# Patient Record
Sex: FEMALE | Race: WHITE
[De-identification: names, ages, dates, MRNs, and addresses within clinical notes are randomized per-mention and may not be internally consistent; named-entity substitution may affect disease eponyms.]

---

## 2020-06-26 ENCOUNTER — HOSPITAL ENCOUNTER (INPATIENT)
Dept: HOSPITAL 36 - GERO | Age: 65
LOS: 22 days | Discharge: ANOTHER HOSPITAL | DRG: 885 | End: 2020-07-18
Attending: PSYCHIATRY & NEUROLOGY | Admitting: PSYCHIATRY & NEUROLOGY
Payer: MEDICARE

## 2020-06-26 DIAGNOSIS — E87.6: ICD-10-CM

## 2020-06-26 DIAGNOSIS — F41.9: ICD-10-CM

## 2020-06-26 DIAGNOSIS — E11.9: ICD-10-CM

## 2020-06-26 DIAGNOSIS — U07.1: ICD-10-CM

## 2020-06-26 DIAGNOSIS — F20.9: Primary | ICD-10-CM

## 2020-06-26 DIAGNOSIS — F12.10: ICD-10-CM

## 2020-06-26 DIAGNOSIS — F10.10: ICD-10-CM

## 2020-06-26 DIAGNOSIS — F30.9: ICD-10-CM

## 2020-06-26 DIAGNOSIS — I10: ICD-10-CM

## 2020-06-26 DIAGNOSIS — N39.0: ICD-10-CM

## 2020-06-26 PROCEDURE — X3904: HCPCS

## 2020-06-26 PROCEDURE — Z7610: HCPCS

## 2020-06-27 VITALS — DIASTOLIC BLOOD PRESSURE: 52 MMHG | SYSTOLIC BLOOD PRESSURE: 100 MMHG

## 2020-06-27 LAB
CHOLEST SERPL-MCNC: 152 MG/DL (ref ?–200)
LDLC SERPL DIRECT ASSAY-MCNC: 90 MG/DL (ref 75–193)
TRIGL SERPL-MCNC: 100 MG/DL (ref ?–150)

## 2020-06-27 RX ADMIN — INSULIN LISPRO SCH: 100 INJECTION, SOLUTION INTRAVENOUS; SUBCUTANEOUS at 16:30

## 2020-06-27 RX ADMIN — INSULIN LISPRO SCH: 100 INJECTION, SOLUTION INTRAVENOUS; SUBCUTANEOUS at 07:30

## 2020-06-27 RX ADMIN — INSULIN LISPRO SCH: 100 INJECTION, SOLUTION INTRAVENOUS; SUBCUTANEOUS at 20:50

## 2020-06-27 RX ADMIN — INSULIN LISPRO SCH: 100 INJECTION, SOLUTION INTRAVENOUS; SUBCUTANEOUS at 11:43

## 2020-06-27 NOTE — HISTORY AND PHYSICAL
History of Present Illness





- HPI


Chief Complaint: 


Psychosis





HPI: 





* Transferred from [facility]


* Patient was transferred on a 5150 hold


Vital Signs: 





 Last Vital Signs











Temp  97.9 F   06/27/20 06:26


 


Pulse  96   06/27/20 06:26


 


Resp  20   06/27/20 06:26


 


BP  121/83   06/27/20 06:26


 


Pulse Ox  96   06/27/20 06:26














Past Medical History


Cardiovascular: Report: No Pertinent Hx


Pulmonary: Report: No Pertinent Hx


CNS: Report: No Pertinent Hx


GI: Report: No Pertinent Hx


Psych: Report: Anxiety, Depression


Infectious Disease: Report: Other (UTI)


Endocrine: Report: Diabetes





Family Medical History





- Family Member


  ** Mother


History Unknown: Yes


Ethnicity: Unknown


Living Status: Unknown


Hx Family Cancer:  (unknown)


Hx Family Coronary Artery Disease:  (unknown)


Hx Family Congestive Heart Failure:  (unknown)


Hx Family Hypertension:  (unknown)


Hx Family Stroke:  (unknown)


Hx Family Diabetes:  (unknown)


Hx Family Seizures:  (unknown)


Hx Family Dementia:  (unknown)


Hx Family AIDS:  (unknown)


Hx Family COPD:  (unknown)


Hx Family Hepatitis:  (unknown)


Hx Family Psychiatric Problems:  (unknown)


Hx Family Tuberculosis:  (unknown)





Social History


Smoke: No


Alcohol: Occassional


Drugs: Marijuana


Lives: With Family





- Allergies


Allergies/Adverse Reactions: 


 Allergies











Allergy/AdvReac Type Severity Reaction Status Date / Time


 


No Known Allergies Allergy   Verified 06/27/20 00:08














Review of Systems





- Review of Systems


Constitutional: Report: No Significant


Eyes: Report: No Significant


ENT: Report: No Significant


Respiratory: Report: No Significant


Cardiovascular: Report: No Significant


Gastrointestinal: Report: No Significant


Genitourinary: Report: No Significant


Musculoskeletal: Report: No Significant


Skin: Report: No Significant


Neurological: Report: No Significant





Physical Exam





- Physical Exam


HEENT: Report: Ears Nose Throat within normal limits, Pharnyx within normal 

limits


Neck: Report: Within normal limits


Cardiovascular Systems: Report: Regular, Rate and Rhythm, no murmurs noted


Respiratory: Report: Clear to Auscultation of lung fields, Breath Sounds are 

within normal limits


Abdomen: Report: Non-tender to palpation, Bowel Sounds are within normal limits


Back: Report: Inspection of back is within normal limits.


Extremities: Report: Non-tender to palpation., Patient had full range of motion

, No pedal edema was noted on inspection


Skin: Report: Color of skin is within normal limits, Warm, Dry, No Rashes noted 

of the skin


Neuro/Psych: Report: CN II-XII intact, No sensory deficit





- Lab Results


All Lab Results last 24 hours: 





 Laboratory Results - last 24 hr











  06/26/20 06/27/20 06/27/20





  23:09 06:18 12:16


 


POC Glucose  112 H  85 


 


Triglycerides    100


 


Cholesterol    152


 


LDL Cholesterol Direct    90


 


HDL Cholesterol    41














- Assessment


Assessment: 





* Manic Disorder


* UTI


* DM


* Anxiety


* Depression


* 5150 Hold


* Alcohol Abuse


* Marijuana Abuse





- Plan


Plan: 





* Admitted to GeroPsych Unit to [facility]


* Psychiatry Consult


* Continue home meds


* Obtain labs





Cranial Nerve Assessment





- CRANIAL NERVES


alcohol swab:: Yes


Distinguishes movements in peripheral field.:: Yes


up, down, sideways:: Yes


on forehead, cheeks and chin, chews symmetrically:: Yes


FACIAL VII: upper: Frowns Symmetrically:: Yes


FACIAL VII: Lower: Smiles Symmetrically:: Yes


both ears:: Yes


GLOSS-PHARYNGEAL IX: Has gag reflex:: Yes


VAGUS X: Can make guttural sounds:: Yes


ACCESSORY XI: Shrugs shoulders symmetrically:: Yes


tremors or fasciculation's:: Yes





- MOTOR


spasticity, cogwheel, atrophy, tremor, asterixis, other: Yes





- COORDINATION


Finger to nose, heel to shin, BILL, gait, Romberg: Yes





- SENSORY


signs, Brudzinski, Kernig, neck rigidity:: Yes





- REFLEXES


Brachioradials Right:: Yes


Brachioradials Left:: Yes


Biceps Right:: Yes


Biceps Left:: Yes


Triceps Right:: Yes


Triceps Left:: Yes


Knee Right:: Yes


Knee Left:: Yes


Ankle Right:: Yes


Ankle Left:: Yes


Babinski Right:: No


Babinski Left:: No

## 2020-06-28 RX ADMIN — INSULIN LISPRO SCH: 100 INJECTION, SOLUTION INTRAVENOUS; SUBCUTANEOUS at 16:37

## 2020-06-28 RX ADMIN — INSULIN LISPRO SCH: 100 INJECTION, SOLUTION INTRAVENOUS; SUBCUTANEOUS at 11:30

## 2020-06-28 RX ADMIN — INSULIN LISPRO SCH: 100 INJECTION, SOLUTION INTRAVENOUS; SUBCUTANEOUS at 21:01

## 2020-06-28 RX ADMIN — INSULIN LISPRO SCH: 100 INJECTION, SOLUTION INTRAVENOUS; SUBCUTANEOUS at 06:38

## 2020-06-28 NOTE — PROGRESS NOTES
DATE:  06/28/2020



SUBJECTIVE:  The patient was seen and evaluated.  The patient's chart reviewed.



Today on face-to-face evaluation, the patient reports that she is watching all

the ____ with COVID.  She reports that people are trying to poison her,

specifically, the nurses with COVID.



EXAMINATION:  Delusional, believing that people are trying to poison her with

COVID.



ASSESSMENT AND PLAN:  Aggressive ____ believing that people are trying to poison

her.  We will continue increasing Seroquel to 50.





DD: 06/28/2020 07:25

DT: 06/28/2020 10:32

[facility]# [id number]  [id number]
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
## 2020-06-29 LAB
ALT SERPL-CCNC: 25 U/L (ref 12–78)
ANION GAP SERPL CALC-SCNC: 20 MMOL/L (ref 5–15)
AST SERPL-CCNC: 33 U/L (ref 10–37)
BASOPHILS # BLD AUTO: 0.1 K/UL (ref 0–0.2)
BASOPHILS NFR BLD AUTO: 0.5 % (ref 0–2)
BILIRUB SERPL-MCNC: 0.9 MG/DL (ref 0–1)
BUN SERPL-MCNC: 20 MG/DL (ref 8–21)
CALCIUM SERPL-MCNC: 9.2 MG/DL (ref 8.4–10.2)
CHLORIDE SERPL-SCNC: 101 MMOL/L (ref 98–107)
CO2 SERPL-SCNC: 21 MMOL/L (ref 23–29)
CREAT SERPL-MCNC: 1.03 MG/DL (ref 0.7–1.3)
EOSINOPHIL # BLD AUTO: 0.1 K/UL (ref 0–0.4)
EOSINOPHIL NFR BLD AUTO: 1.2 % (ref 0–4)
ERYTHROCYTE [DISTWIDTH] IN BLOOD BY AUTOMATED COUNT: 13.3 % (ref 9–15)
HCT VFR BLD CALC: 46.3 % (ref 36–48)
HGB BLD-MCNC: 15.8 G/DL (ref 12–16)
LYMPHOCYTE AB SER FC-ACNC: 1.7 K/UL (ref 1–5.5)
LYMPHOCYTES NFR BLD AUTO: 15.2 % (ref 20.5–51.5)
MCH RBC QN AUTO: 32 PG (ref 27–31)
MCHC RBC AUTO-ENTMCNC: 34 % (ref 32–36)
MCV RBC AUTO: 94 FL (ref 79–98)
MONOCYTES # BLD AUTO: 0.8 K/UL (ref 0–1)
MONOCYTES NFR BLD AUTO: 7.3 % (ref 1.7–9.3)
NEUTROPHILS # BLD: 8.5 K/UL (ref 1.8–7.7)
NEUTROPHILS NFR BLD AUTO: 75.8 % (ref 40–70)
PLATELET # BLD: 228 K/UL (ref 130–430)
POTASSIUM SERPL-SCNC: 3.1 MMOL/L (ref 3.5–5.1)
RBC # BLD AUTO: 4.95 MIL/UL (ref 4.2–6.2)
WBC # BLD AUTO: 11.3 K/UL (ref 4.8–10.8)

## 2020-06-29 RX ADMIN — INSULIN LISPRO SCH: 100 INJECTION, SOLUTION INTRAVENOUS; SUBCUTANEOUS at 06:38

## 2020-06-29 RX ADMIN — INSULIN LISPRO SCH: 100 INJECTION, SOLUTION INTRAVENOUS; SUBCUTANEOUS at 11:10

## 2020-06-29 RX ADMIN — INSULIN LISPRO SCH: 100 INJECTION, SOLUTION INTRAVENOUS; SUBCUTANEOUS at 20:59

## 2020-06-29 RX ADMIN — INSULIN LISPRO SCH: 100 INJECTION, SOLUTION INTRAVENOUS; SUBCUTANEOUS at 16:09
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
## 2020-06-29 NOTE — INTERNAL MEDICINE PROG NOTE
Internal Medicine Subjective





- Subjective


Service Date: 06/29/20


Patient seen and examined:: without staff


Patient is:: awake, interactive, in bed





Internal Medicine Objective





- Results


Result Diagrams: 


 06/29/20 11:17





 06/29/20 11:17


Recent Labs: 


 Laboratory Last Values











WBC  11.3 K/uL (4.8-10.8)  H  06/29/20  11:17    


 


RBC  4.95 MIL/uL (4.2-6.2)   06/29/20  11:17    


 


Hgb  15.8 g/dL (12.0-16.0)   06/29/20  11:17    


 


Hct  46.3 % (36-48)   06/29/20  11:17    


 


MCV  94 fL (79.0-98.0)   06/29/20  11:17    


 


MCH  32 pg (27-31)  H  06/29/20  11:17    


 


MCHC  34 % (32-36)   06/29/20  11:17    


 


RDW  13.3 % (9.0-15.0)   06/29/20  11:17    


 


Plt Count  228 K/uL (130-430)   06/29/20  11:17    


 


MPV  10.7 fl (7.4-10.4)  H  06/29/20  11:17    


 


Neut % (Auto)  75.8 % (40-70)  H  06/29/20  11:17    


 


Lymph % (Auto)  15.2 % (20.5-51.5)  L  06/29/20  11:17    


 


Mono % (Auto)  7.3 % (1.7-9.3)   06/29/20  11:17    


 


Eos % (Auto)  1.2 % (0-4)   06/29/20  11:17    


 


Baso % (Auto)  0.5 % (0.0-2.0)   06/29/20  11:17    


 


Neut # (Auto)  8.5 K/uL (1.8-7.7)  H  06/29/20  11:17    


 


Lymph # (Auto)  1.7 K/uL (1.0-5.5)   06/29/20  11:17    


 


Mono # (Auto)  0.8 K/uL (0.0-1.0)   06/29/20  11:17    


 


Eos # (Auto)  0.1 K/uL (0.0-0.4)   06/29/20  11:17    


 


Baso # (Auto)  0.1 K/uL (0.0-0.2)   06/29/20  11:17    


 


Sodium  139 mmol/L (136-145)   06/29/20  11:17    


 


Potassium  3.1 mmol/L (3.5-5.1)  L  06/29/20  11:17    


 


Chloride  101 mmol/L ()   06/29/20  11:17    


 


Carbon Dioxide  21 mmol/L (23-29)  L  06/29/20  11:17    


 


Anion Gap  20  (5-15)  H  06/29/20  11:17    


 


BUN  20 mg/dL (8-21)   06/29/20  11:17    


 


Creatinine  1.03 mg/dL (0.70-1.30)   06/29/20  11:17    


 


Glucose  75 mg/dL (70-99)   06/29/20  11:17    


 


POC Glucose  100 MG/DL (70 - 105)   06/29/20  11:03    


 


Calcium  9.2 mg/dL (8.4-10.2)   06/29/20  11:17    


 


Total Bilirubin  0.9 mg/dL (0.0-1.0)   06/29/20  11:17    


 


AST  33 U/L (10-37)   06/29/20  11:17    


 


ALT  25 U/L (12-78)   06/29/20  11:17    


 


Alkaline Phosphatase  89 U/L ()   06/29/20  11:17    


 


Total Protein  7.6 g/dL (6.4-8.3)   06/29/20  11:17    


 


Albumin  3.8 g/dL (3.4-5.0)   06/29/20  11:17    


 


Triglycerides  100 mg/dL (<150)   06/27/20  12:16    


 


Cholesterol  152 mg/dL (<200)   06/27/20  12:16    


 


LDL Cholesterol Direct  90 mg/dL ()   06/27/20  12:16    


 


HDL Cholesterol  41 mg/dL (23-92)   06/27/20  12:16    














- Physical Exam


Vitals and I&O: 


 Vital Signs











Temp  0 F   06/29/20 06:49


 


Pulse  90   06/28/20 06:11


 


Resp  17   06/28/20 20:00


 


BP  132/84   06/28/20 06:11


 


Pulse Ox  99   06/28/20 06:11








 Intake & Output











 06/28/20 06/29/20 06/29/20





 18:59 06:59 18:59


 


Intake Total 800 240 


 


Balance 800 240 


 


Intake:   


 


  Oral 800 240 


 


Other:   


 


  # Voids 3 3 


 


  # Bowel Movements 0 0 


 


  Stool Characteristics Soft Soft Soft





  Formed 





  Brown 











Active Medications: 


Current Medications





Acetaminophen (Tylenol)  650 mg PO Q4H PRN


   PRN Reason: Pain (Mild 1-3)


   Stop: 08/26/20 00:26


Al Hydrox/Mg Hydrox/Simethicone (Maalox)  30 ml PO Q4HR PRN


   PRN Reason: GI DISTRESS


   Stop: 08/26/20 00:26


Dextrose (Glutose 40%)  18.75 gm PO PRN PRN


   PRN Reason: Blood Glucose less than 70


   Stop: 08/26/20 01:51


Glucagon (Glucagen)  1 mg IM PRN PRN


   PRN Reason: Blood Glucose less than 70


   Stop: 08/26/20 01:51


Insulin Human Lispro (Humalog Insulin Sliding Scale)  [facility]; 

Protocol


   Stop: 08/26/20 07:29


   Last Admin: 06/29/20 11:10 Dose:  Not Given


Lorazepam (Ativan)  0.5 mg PO Q4HR PRN; Protocol


   PRN Reason: Agitation


   Stop: 07/27/20 00:26


Magnesium Hydroxide (Milk Of Magnesia)  30 ml PO HS PRN


   PRN Reason: Constipation


Multivitamins/Vitamin C (Theragran)  1 tab PO DAILY LARRY


   Stop: 08/26/20 08:59


   Last Admin: 06/29/20 08:11 Dose:  Not Given


Quetiapine Fumarate (Seroquel)  50 mg PO HS LARRY; Protocol


   Stop: 08/27/20 20:59


   Last Admin: 06/28/20 21:01 Dose:  Not Given


Zolpidem Tartrate (Ambien)  5 mg PO HS PRN


   PRN Reason: Insomnia


   Stop: 08/26/20 00:26








General: weak


HEENT: NC/AT, PERRLA


Neck: Supple


Lungs: CTAB


Cardiovascular: RRR, Normal S1, Normal S2


Abdomen: soft


Extremities: clear





Internal Medicine Assmt/Plan





- Assessment


Assessment: 





1.  DM/HTN


2.  Callie





- Plan


Plan: 





check hga1c


check lipid panel


continue sliding scale insulin


requested home meds


d/w r.n.


reviewed complete medical records

## 2020-06-30 LAB
APPEARANCE UR: (no result)
BILIRUB UR QL STRIP: (no result)
COLOR UR: YELLOW
GLUCOSE UR STRIP-MCNC: NEGATIVE MG/DL
HGB UR QL STRIP: (no result)
KETONES UR STRIP-MCNC: (no result) MG/DL
LEUKOCYTE ESTERASE UR QL STRIP: NEGATIVE
NITRITE UR QL STRIP: NEGATIVE
PH UR STRIP: 6 [PH] (ref 5–8)
PROT UR QL STRIP: (no result)
SP GR UR STRIP: >=1.03 (ref 1–1.03)
UROBILINOGEN UR STRIP-MCNC: 0.2 MG/DL (ref 0.2–1)

## 2020-06-30 RX ADMIN — INSULIN LISPRO SCH: 100 INJECTION, SOLUTION INTRAVENOUS; SUBCUTANEOUS at 06:34

## 2020-06-30 RX ADMIN — INSULIN LISPRO SCH: 100 INJECTION, SOLUTION INTRAVENOUS; SUBCUTANEOUS at 20:31

## 2020-06-30 RX ADMIN — INSULIN LISPRO SCH: 100 INJECTION, SOLUTION INTRAVENOUS; SUBCUTANEOUS at 15:52

## 2020-06-30 RX ADMIN — INSULIN LISPRO SCH: 100 INJECTION, SOLUTION INTRAVENOUS; SUBCUTANEOUS at 12:23

## 2020-06-30 NOTE — INTERNAL MEDICINE PROG NOTE
Internal Medicine Subjective





- Subjective


Service Date: 06/30/20


Patient seen and examined:: without staff


Patient is:: awake, interactive, in bed


Per staff patient has:: no adverse event, no episodes of fall (no fevers, no 

cough, no sob)





Internal Medicine Objective





- Results


Result Diagrams: 


 06/29/20 11:17





 06/29/20 11:17


Recent Labs: 


 Laboratory Last Values











WBC  11.3 K/uL (4.8-10.8)  H  06/29/20  11:17    


 


RBC  4.95 MIL/uL (4.2-6.2)   06/29/20  11:17    


 


Hgb  15.8 g/dL (12.0-16.0)   06/29/20  11:17    


 


Hct  46.3 % (36-48)   06/29/20  11:17    


 


MCV  94 fL (79.0-98.0)   06/29/20  11:17    


 


MCH  32 pg (27-31)  H  06/29/20  11:17    


 


MCHC  34 % (32-36)   06/29/20  11:17    


 


RDW  13.3 % (9.0-15.0)   06/29/20  11:17    


 


Plt Count  228 K/uL (130-430)   06/29/20  11:17    


 


MPV  10.7 fl (7.4-10.4)  H  06/29/20  11:17    


 


Neut % (Auto)  75.8 % (40-70)  H  06/29/20  11:17    


 


Lymph % (Auto)  15.2 % (20.5-51.5)  L  06/29/20  11:17    


 


Mono % (Auto)  7.3 % (1.7-9.3)   06/29/20  11:17    


 


Eos % (Auto)  1.2 % (0-4)   06/29/20  11:17    


 


Baso % (Auto)  0.5 % (0.0-2.0)   06/29/20  11:17    


 


Neut # (Auto)  8.5 K/uL (1.8-7.7)  H  06/29/20  11:17    


 


Lymph # (Auto)  1.7 K/uL (1.0-5.5)   06/29/20  11:17    


 


Mono # (Auto)  0.8 K/uL (0.0-1.0)   06/29/20  11:17    


 


Eos # (Auto)  0.1 K/uL (0.0-0.4)   06/29/20  11:17    


 


Baso # (Auto)  0.1 K/uL (0.0-0.2)   06/29/20  11:17    


 


Sodium  139 mmol/L (136-145)   06/29/20  11:17    


 


Potassium  3.1 mmol/L (3.5-5.1)  L  06/29/20  11:17    


 


Chloride  101 mmol/L ()   06/29/20  11:17    


 


Carbon Dioxide  21 mmol/L (23-29)  L  06/29/20  11:17    


 


Anion Gap  20  (5-15)  H  06/29/20  11:17    


 


BUN  20 mg/dL (8-21)   06/29/20  11:17    


 


Creatinine  1.03 mg/dL (0.70-1.30)   06/29/20  11:17    


 


Glucose  75 mg/dL (70-99)   06/29/20  11:17    


 


POC Glucose  100 MG/DL (70 - 105)   06/29/20  11:03    


 


Calcium  9.2 mg/dL (8.4-10.2)   06/29/20  11:17    


 


Total Bilirubin  0.9 mg/dL (0.0-1.0)   06/29/20  11:17    


 


AST  33 U/L (10-37)   06/29/20  11:17    


 


ALT  25 U/L (12-78)   06/29/20  11:17    


 


Alkaline Phosphatase  89 U/L ()   06/29/20  11:17    


 


Total Protein  7.6 g/dL (6.4-8.3)   06/29/20  11:17    


 


Albumin  3.8 g/dL (3.4-5.0)   06/29/20  11:17    


 


Triglycerides  100 mg/dL (<150)   06/27/20  12:16    


 


Cholesterol  152 mg/dL (<200)   06/27/20  12:16    


 


LDL Cholesterol Direct  90 mg/dL ()   06/27/20  12:16    


 


HDL Cholesterol  41 mg/dL (23-92)   06/27/20  12:16    


 


Urine Color  YELLOW  (YELLOW)   06/29/20  11:00    


 


Urine Clarity  HAZY  (CLEAR)  H  06/29/20  11:00    


 


Urine pH  6.0  (5.0-8.0)   06/29/20  11:00    


 


Ur Specific Gravity  >=1.030  (1.005-1.030)  H  06/29/20  11:00    


 


Urine Protein  1+  (NEGATIVE)  H  06/29/20  11:00    


 


Urine Ketones  3+  (NEGATIVE)  H  06/29/20  11:00    


 


Urine Blood  TRACE  (NEGATIVE)  H  06/29/20  11:00    


 


Urine Nitrate  NEGATIVE  (NEGATIVE)   06/29/20  11:00    


 


Urine Bilirubin  1+  (NEGATIVE)  H  06/29/20  11:00    


 


Urine Urobilinogen  0.2  (0.2-1.0)   06/29/20  11:00    


 


Ur Leukocyte Esterase  NEGATIVE  (NEGATIVE)   06/29/20  11:00    


 


Urine Glucose  NEGATIVE  (NEGATIVE)   06/29/20  11:00    














- Physical Exam


Vitals and I&O: 


 Vital Signs











Temp  98.3 F   06/30/20 06:23


 


Pulse  76   06/30/20 06:23


 


Resp  18   06/30/20 08:00


 


BP  129/89   06/30/20 06:23


 


Pulse Ox  97   06/30/20 06:23








 Intake & Output











 06/29/20 06/30/20 06/30/20





 18:59 06:59 18:59


 


Intake Total 450 240 


 


Balance 450 240 


 


Intake:   


 


  Oral 450 240 


 


Other:   


 


  # Voids 3 3 


 


  # Bowel Movements 0 0 


 


  Stool Characteristics Soft Soft 





  Formed 





  Brown 











Active Medications: 


Current Medications





Acetaminophen (Tylenol)  650 mg PO Q4H PRN


   PRN Reason: Pain (Mild 1-3)


   Stop: 08/26/20 00:26


Al Hydrox/Mg Hydrox/Simethicone (Maalox)  30 ml PO Q4HR PRN


   PRN Reason: GI DISTRESS


   Stop: 08/26/20 00:26


Dextrose (Glutose 40%)  18.75 gm PO PRN PRN


   PRN Reason: BS Below 70 if tolerate po


   Stop: 08/26/20 01:51


Glucagon (Glucagen)  1 mg IM PRN PRN


   PRN Reason: BS Below 70 if not tolerate po


   Stop: 08/26/20 01:51


Insulin Human Lispro (Humalog Insulin Sliding Scale)  0 units SUBQ [facility] LARRY; 

Protocol


   Stop: 08/26/20 07:29


   Last Admin: 06/30/20 12:23 Dose:  Not Given


Lorazepam (Ativan)  0.5 mg PO Q4HR PRN; Protocol


   PRN Reason: Agitation


   Stop: 07/27/20 00:26


Magnesium Hydroxide (Milk Of Magnesia)  30 ml PO HS PRN


   PRN Reason: Constipation


Multivitamins/Vitamin C (Theragran)  1 tab PO DAILY LARRY


   Stop: 08/26/20 08:59


   Last Admin: 06/30/20 08:01 Dose:  Not Given


Quetiapine Fumarate (Seroquel)  50 mg PO HS LARRY; Protocol


   Stop: 08/27/20 20:59


   Last Admin: 06/29/20 20:59 Dose:  Not Given


Zolpidem Tartrate (Ambien)  5 mg PO HS PRN


   PRN Reason: Insomnia


   Stop: 08/26/20 00:26








General: weak


HEENT: NC/AT, PERRLA


Neck: Supple


Lungs: CTAB


Cardiovascular: RRR, Normal S1, Normal S2


Abdomen: soft


Extremities: clear





Internal Medicine Assmt/Plan





- Assessment


Assessment: 





1.  DM/HTN


2.  Rule out Covid-19





- Plan


Plan: 





check hga1c


check lipid panel


continue sliding scale insulin


requested home meds


Covid-19 PCR test


d/w r.n.


reviewed complete medical records

## 2020-07-01 LAB — HBA1C BLD-MCNC: 5 G/DL

## 2020-07-01 RX ADMIN — INSULIN LISPRO SCH: 100 INJECTION, SOLUTION INTRAVENOUS; SUBCUTANEOUS at 21:28

## 2020-07-01 RX ADMIN — INSULIN LISPRO SCH: 100 INJECTION, SOLUTION INTRAVENOUS; SUBCUTANEOUS at 06:32

## 2020-07-01 RX ADMIN — INSULIN LISPRO SCH: 100 INJECTION, SOLUTION INTRAVENOUS; SUBCUTANEOUS at 17:19

## 2020-07-01 RX ADMIN — INSULIN LISPRO SCH: 100 INJECTION, SOLUTION INTRAVENOUS; SUBCUTANEOUS at 12:40

## 2020-07-01 NOTE — INTERNAL MEDICINE PROG NOTE
Internal Medicine Subjective





- Subjective


Service Date: 07/01/20


Patient is:: awake, interactive, in bed


Per staff patient has:: no adverse event, no episodes of fall (no fevers, no 

cough, no sob)





Internal Medicine Objective





- Results


Result Diagrams: 


 06/29/20 11:17





 06/29/20 11:17


Recent Labs: 


 Laboratory Last Values











WBC  11.3 K/uL (4.8-10.8)  H  06/29/20  11:17    


 


RBC  4.95 MIL/uL (4.2-6.2)   06/29/20  11:17    


 


Hgb  15.8 g/dL (12.0-16.0)   06/29/20  11:17    


 


Hct  46.3 % (36-48)   06/29/20  11:17    


 


MCV  94 fL (79.0-98.0)   06/29/20  11:17    


 


MCH  32 pg (27-31)  H  06/29/20  11:17    


 


MCHC  34 % (32-36)   06/29/20  11:17    


 


RDW  13.3 % (9.0-15.0)   06/29/20  11:17    


 


Plt Count  228 K/uL (130-430)   06/29/20  11:17    


 


MPV  10.7 fl (7.4-10.4)  H  06/29/20  11:17    


 


Neut % (Auto)  75.8 % (40-70)  H  06/29/20  11:17    


 


Lymph % (Auto)  15.2 % (20.5-51.5)  L  06/29/20  11:17    


 


Mono % (Auto)  7.3 % (1.7-9.3)   06/29/20  11:17    


 


Eos % (Auto)  1.2 % (0-4)   06/29/20  11:17    


 


Baso % (Auto)  0.5 % (0.0-2.0)   06/29/20  11:17    


 


Neut # (Auto)  8.5 K/uL (1.8-7.7)  H  06/29/20  11:17    


 


Lymph # (Auto)  1.7 K/uL (1.0-5.5)   06/29/20  11:17    


 


Mono # (Auto)  0.8 K/uL (0.0-1.0)   06/29/20  11:17    


 


Eos # (Auto)  0.1 K/uL (0.0-0.4)   06/29/20  11:17    


 


Baso # (Auto)  0.1 K/uL (0.0-0.2)   06/29/20  11:17    


 


Sodium  139 mmol/L (136-145)   06/29/20  11:17    


 


Potassium  3.1 mmol/L (3.5-5.1)  L  06/29/20  11:17    


 


Chloride  101 mmol/L ()   06/29/20  11:17    


 


Carbon Dioxide  21 mmol/L (23-29)  L  06/29/20  11:17    


 


Anion Gap  20  (5-15)  H  06/29/20  11:17    


 


BUN  20 mg/dL (8-21)   06/29/20  11:17    


 


Creatinine  1.03 mg/dL (0.70-1.30)   06/29/20  11:17    


 


Glucose  75 mg/dL (70-99)   06/29/20  11:17    


 


POC Glucose  100 MG/DL (70 - 105)   06/29/20  11:03    


 


Calcium  9.2 mg/dL (8.4-10.2)   06/29/20  11:17    


 


Total Bilirubin  0.9 mg/dL (0.0-1.0)   06/29/20  11:17    


 


AST  33 U/L (10-37)   06/29/20  11:17    


 


ALT  25 U/L (12-78)   06/29/20  11:17    


 


Alkaline Phosphatase  89 U/L ()   06/29/20  11:17    


 


Total Protein  7.6 g/dL (6.4-8.3)   06/29/20  11:17    


 


Albumin  3.8 g/dL (3.4-5.0)   06/29/20  11:17    


 


Triglycerides  100 mg/dL (<150)   06/27/20  12:16    


 


Cholesterol  152 mg/dL (<200)   06/27/20  12:16    


 


LDL Cholesterol Direct  90 mg/dL ()   06/27/20  12:16    


 


HDL Cholesterol  41 mg/dL (23-92)   06/27/20  12:16    


 


Urine Color  YELLOW  (YELLOW)   06/29/20  11:00    


 


Urine Clarity  HAZY  (CLEAR)  H  06/29/20  11:00    


 


Urine pH  6.0  (5.0-8.0)   06/29/20  11:00    


 


Ur Specific Gravity  >=1.030  (1.005-1.030)  H  06/29/20  11:00    


 


Urine Protein  1+  (NEGATIVE)  H  06/29/20  11:00    


 


Urine Ketones  3+  (NEGATIVE)  H  06/29/20  11:00    


 


Urine Blood  TRACE  (NEGATIVE)  H  06/29/20  11:00    


 


Urine Nitrate  NEGATIVE  (NEGATIVE)   06/29/20  11:00    


 


Urine Bilirubin  1+  (NEGATIVE)  H  06/29/20  11:00    


 


Urine Urobilinogen  0.2  (0.2-1.0)   06/29/20  11:00    


 


Ur Leukocyte Esterase  NEGATIVE  (NEGATIVE)   06/29/20  11:00    


 


Urine Glucose  NEGATIVE  (NEGATIVE)   06/29/20  11:00    


 


Coronavirus (PCR)  Negative  (Negative)   06/28/20  15:45    














- Physical Exam


Vitals and I&O: 


 Vital Signs











Temp  97 F   07/01/20 14:00


 


Pulse  80   07/01/20 14:00


 


Resp  18   07/01/20 14:00


 


BP  136/79   07/01/20 14:00


 


Pulse Ox  98   07/01/20 14:00








 Intake & Output











 06/30/20 07/01/20 07/01/20





 18:59 06:59 18:59


 


Intake Total 360 240 120


 


Balance 360 240 120


 


Intake:   


 


  Oral 360 240 120


 


Other:   


 


  # Voids 3 3 2


 


  # Bowel Movements 0 0 0











Active Medications: 


Current Medications





Acetaminophen (Tylenol)  650 mg PO Q4H PRN


   PRN Reason: Pain (Mild 1-3)


   Stop: 08/26/20 00:26


Al Hydrox/Mg Hydrox/Simethicone (Maalox)  30 ml PO Q4HR PRN


   PRN Reason: GI DISTRESS


   Stop: 08/26/20 00:26


Dextrose (Glutose 40%)  18.75 gm PO PRN PRN


   PRN Reason: BS Below 70 if tolerate po


   Stop: 08/26/20 01:51


Glucagon (Glucagen)  1 mg IM PRN PRN


   PRN Reason: BS Below 70 if not tolerate po


   Stop: 08/26/20 01:51


Insulin Human Lispro (Humalog Insulin Sliding Scale)  0 units SUBQ ACHS LARRY; 

Protocol


   Stop: 08/26/20 07:29


   Last Admin: 07/01/20 17:19 Dose:  Not Given


Lorazepam (Ativan)  0.5 mg PO Q4HR PRN; Protocol


   PRN Reason: Agitation


   Stop: 07/27/20 00:26


Magnesium Hydroxide (Milk Of Magnesia)  30 ml PO HS PRN


   PRN Reason: Constipation


Multivitamins/Vitamin C (Theragran)  1 tab PO DAILY LARRY


   Stop: 08/26/20 08:59


   Last Admin: 07/01/20 08:21 Dose:  Not Given


Quetiapine Fumarate (Seroquel)  50 mg PO HS LARRY; Protocol


   Stop: 08/27/20 20:59


   Last Admin: 06/30/20 20:31 Dose:  Not Given


Zolpidem Tartrate (Ambien)  5 mg PO HS PRN


   PRN Reason: Insomnia


   Stop: 08/26/20 00:26








General: weak


HEENT: NC/AT, PERRLA


Neck: Supple


Lungs: CTAB


Cardiovascular: RRR, Normal S1, Normal S2


Abdomen: soft


Extremities: clear





Internal Medicine Assmt/Plan





- Assessment


Assessment: 





1.  DM


2.  HTN








- Plan


Plan: 








check lipid panel


continue sliding scale insulin


requested home meds


await Covid-19 PCR test


d/w r.n.


reviewed complete medical records

## 2020-07-01 NOTE — PROGRESS NOTES
DATE:     07/01/2020



Case was discussed with staff of the patient, reviewed records.  The patient

seems to be delusional and paranoid.  When I tried talk to her, she apparently

had her breakfast tray and she eat none of it.  She said I do not need that. 

She said I can see what's in it.  She continues to talk about her family being 
peophiles

and that she will not talk to them that she is scared of them.  Does not want

anything to have to do them including her .  The patient is unable to

make safe plan for self-care.  The patient was found sleeping in someone's

garage, was acting aggressive with a history of schizophrenia.  The patient

believes that she reported this to the [employer] and they are underway.  The patient

is refusing medication again.  When I asked if she take she said she will take

nothing.  She does not trust anybody.  She believes she does not need any

medication, so this has been brought up with her almost on a daily basis and she

does not believe she needs to be on medication, she would not tell me what

medication she will be willing to take, i listed side effects and alternatives 
so I will be initiating  a reise.  We will

continue outpatient group therapy, milieu therapy, and adjust medications as

needed.





DD: 07/01/2020 08:13

DT: 07/01/2020 11:40

JOB# [id number]  [id number]

ISIDRA

## 2020-07-02 RX ADMIN — INSULIN LISPRO SCH: 100 INJECTION, SOLUTION INTRAVENOUS; SUBCUTANEOUS at 11:25

## 2020-07-02 RX ADMIN — INSULIN LISPRO SCH: 100 INJECTION, SOLUTION INTRAVENOUS; SUBCUTANEOUS at 16:35

## 2020-07-02 RX ADMIN — INSULIN LISPRO SCH: 100 INJECTION, SOLUTION INTRAVENOUS; SUBCUTANEOUS at 21:10

## 2020-07-02 RX ADMIN — INSULIN LISPRO SCH: 100 INJECTION, SOLUTION INTRAVENOUS; SUBCUTANEOUS at 06:50

## 2020-07-02 NOTE — INTERNAL MEDICINE PROG NOTE
Internal Medicine Subjective





- Subjective


Service Date: 07/02/20


Patient is:: awake, interactive, in bed


Per staff patient has:: no adverse event, no episodes of fall (no fevers, no 

cough, no sob)





Internal Medicine Objective





- Results


Result Diagrams: 


 06/29/20 11:17





 06/29/20 11:17


Recent Labs: 


 Laboratory Last Values











WBC  11.3 K/uL (4.8-10.8)  H  06/29/20  11:17    


 


RBC  4.95 MIL/uL (4.2-6.2)   06/29/20  11:17    


 


Hgb  15.8 g/dL (12.0-16.0)   06/29/20  11:17    


 


Hct  46.3 % (36-48)   06/29/20  11:17    


 


MCV  94 fL (79.0-98.0)   06/29/20  11:17    


 


MCH  32 pg (27-31)  H  06/29/20  11:17    


 


MCHC  34 % (32-36)   06/29/20  11:17    


 


RDW  13.3 % (9.0-15.0)   06/29/20  11:17    


 


Plt Count  228 K/uL (130-430)   06/29/20  11:17    


 


MPV  10.7 fl (7.4-10.4)  H  06/29/20  11:17    


 


Neut % (Auto)  75.8 % (40-70)  H  06/29/20  11:17    


 


Lymph % (Auto)  15.2 % (20.5-51.5)  L  06/29/20  11:17    


 


Mono % (Auto)  7.3 % (1.7-9.3)   06/29/20  11:17    


 


Eos % (Auto)  1.2 % (0-4)   06/29/20  11:17    


 


Baso % (Auto)  0.5 % (0.0-2.0)   06/29/20  11:17    


 


Neut # (Auto)  8.5 K/uL (1.8-7.7)  H  06/29/20  11:17    


 


Lymph # (Auto)  1.7 K/uL (1.0-5.5)   06/29/20  11:17    


 


Mono # (Auto)  0.8 K/uL (0.0-1.0)   06/29/20  11:17    


 


Eos # (Auto)  0.1 K/uL (0.0-0.4)   06/29/20  11:17    


 


Baso # (Auto)  0.1 K/uL (0.0-0.2)   06/29/20  11:17    


 


Sodium  139 mmol/L (136-145)   06/29/20  11:17    


 


Potassium  3.1 mmol/L (3.5-5.1)  L  06/29/20  11:17    


 


Chloride  101 mmol/L ()   06/29/20  11:17    


 


Carbon Dioxide  21 mmol/L (23-29)  L  06/29/20  11:17    


 


Anion Gap  20  (5-15)  H  06/29/20  11:17    


 


BUN  20 mg/dL (8-21)   06/29/20  11:17    


 


Creatinine  1.03 mg/dL (0.70-1.30)   06/29/20  11:17    


 


Glucose  75 mg/dL (70-99)   06/29/20  11:17    


 


POC Glucose  100 MG/DL (70 - 105)   06/29/20  11:03    


 


Calcium  9.2 mg/dL (8.4-10.2)   06/29/20  11:17    


 


Total Bilirubin  0.9 mg/dL (0.0-1.0)   06/29/20  11:17    


 


AST  33 U/L (10-37)   06/29/20  11:17    


 


ALT  25 U/L (12-78)   06/29/20  11:17    


 


Alkaline Phosphatase  89 U/L ()   06/29/20  11:17    


 


Total Protein  7.6 g/dL (6.4-8.3)   06/29/20  11:17    


 


Albumin  3.8 g/dL (3.4-5.0)   06/29/20  11:17    


 


Triglycerides  100 mg/dL (<150)   06/27/20  12:16    


 


Cholesterol  152 mg/dL (<200)   06/27/20  12:16    


 


LDL Cholesterol Direct  90 mg/dL ()   06/27/20  12:16    


 


HDL Cholesterol  41 mg/dL (23-92)   06/27/20  12:16    


 


Urine Color  YELLOW  (YELLOW)   06/29/20  11:00    


 


Urine Clarity  HAZY  (CLEAR)  H  06/29/20  11:00    


 


Urine pH  6.0  (5.0-8.0)   06/29/20  11:00    


 


Ur Specific Gravity  >=1.030  (1.005-1.030)  H  06/29/20  11:00    


 


Urine Protein  1+  (NEGATIVE)  H  06/29/20  11:00    


 


Urine Ketones  3+  (NEGATIVE)  H  06/29/20  11:00    


 


Urine Blood  TRACE  (NEGATIVE)  H  06/29/20  11:00    


 


Urine Nitrate  NEGATIVE  (NEGATIVE)   06/29/20  11:00    


 


Urine Bilirubin  1+  (NEGATIVE)  H  06/29/20  11:00    


 


Urine Urobilinogen  0.2  (0.2-1.0)   06/29/20  11:00    


 


Ur Leukocyte Esterase  NEGATIVE  (NEGATIVE)   06/29/20  11:00    


 


Urine Glucose  NEGATIVE  (NEGATIVE)   06/29/20  11:00    


 


Coronavirus (PCR)  Negative  (Negative)   06/28/20  15:45    














- Physical Exam


Vitals and I&O: 


 Vital Signs











Temp  98.2 F   07/02/20 14:00


 


Pulse  90   07/02/20 14:00


 


Resp  20   07/02/20 14:00


 


BP  130/88   07/02/20 14:00


 


Pulse Ox  96   07/02/20 14:00








 Intake & Output











 07/01/20 07/02/20 07/02/20





 18:59 06:59 18:59


 


Intake Total 520 120 


 


Balance 520 120 


 


Intake:   


 


  Oral 520 120 


 


Other:   


 


  # Voids 3 1 


 


  # Bowel Movements 0 0 











Active Medications: 


Current Medications





Acetaminophen (Tylenol)  650 mg PO Q4H PRN


   PRN Reason: Pain (Mild 1-3)


   Stop: 08/26/20 00:26


Al Hydrox/Mg Hydrox/Simethicone (Maalox)  30 ml PO Q4HR PRN


   PRN Reason: GI DISTRESS


   Stop: 08/26/20 00:26


Dextrose (Glutose 40%)  18.75 gm PO PRN PRN


   PRN Reason: BS Below 70 if tolerate po


   Stop: 08/26/20 01:51


Glucagon (Glucagen)  1 mg IM PRN PRN


   PRN Reason: BS Below 70 if not tolerate po


   Stop: 08/26/20 01:51


Insulin Human Lispro (Humalog Insulin Sliding Scale)  0 units SUBQ ACHS LARRY; 

Protocol


   Stop: 08/26/20 07:29


   Last Admin: 07/02/20 11:25 Dose:  Not Given


Lorazepam (Ativan)  0.5 mg PO Q4HR PRN; Protocol


   PRN Reason: Agitation


   Stop: 07/27/20 00:26


Magnesium Hydroxide (Milk Of Magnesia)  30 ml PO HS PRN


   PRN Reason: Constipation


Multivitamins/Vitamin C (Theragran)  1 tab PO DAILY LARRY


   Stop: 08/26/20 08:59


   Last Admin: 07/02/20 08:29 Dose:  Not Given


Quetiapine Fumarate (Seroquel)  50 mg PO HS LARRY; Protocol


   Stop: 08/27/20 20:59


   Last Admin: 07/01/20 21:28 Dose:  Not Given


Zolpidem Tartrate (Ambien)  5 mg PO HS PRN


   PRN Reason: Insomnia


   Stop: 08/26/20 00:26








General: weak


HEENT: NC/AT, PERRLA


Neck: Supple


Lungs: CTAB


Cardiovascular: RRR, Normal S1, Normal S2


Abdomen: soft


Extremities: clear





Internal Medicine Assmt/Plan





- Assessment


Assessment: 





1.  DM


2.  HTN








- Plan


Plan: 








check hemoglobin a1c


continue sliding scale insulin


d/w r.n.


reviewed complete medical records

## 2020-07-03 RX ADMIN — INSULIN LISPRO SCH: 100 INJECTION, SOLUTION INTRAVENOUS; SUBCUTANEOUS at 16:21

## 2020-07-03 RX ADMIN — INSULIN LISPRO SCH: 100 INJECTION, SOLUTION INTRAVENOUS; SUBCUTANEOUS at 20:51

## 2020-07-03 RX ADMIN — INSULIN LISPRO SCH: 100 INJECTION, SOLUTION INTRAVENOUS; SUBCUTANEOUS at 06:31

## 2020-07-03 RX ADMIN — INSULIN LISPRO SCH: 100 INJECTION, SOLUTION INTRAVENOUS; SUBCUTANEOUS at 11:30

## 2020-07-03 NOTE — PROGRESS NOTES
DATE:  07/03/2020



Case was discussed with staff of the patient, reviewed records.  The patient

continues to refuse medication.  I try to talk her today, she could not tell me

to go away.  I asked her again what medication would do take, she says that she

is not supposed to be on any medication that she has no problem, religiously

preoccupied.  She is acting like she is Max on the cross, continues to be

unable to participate in a meaningful conversation or make safe plan for

self-care.  I listed for her the side effect, so she pretend that is not hearing

it; however, I am not sure even she hears it, she is acting psychotic.  We do

have a [place] hearing scheduled for Monday.  I tried to tell her the alternative,

I am not sure if she was able to listen to me and so far she continues to be

unpredictable, impulsive, and she is sleeping better.  Sometimes she does not

eat.  We will continue outpatient group therapy, milieu therapy, adjust

medication as needed.





DD: 07/03/2020 11:14

DT: 07/03/2020 23:08

JOB# [id number]  [id number]

## 2020-07-04 RX ADMIN — INSULIN LISPRO SCH: 100 INJECTION, SOLUTION INTRAVENOUS; SUBCUTANEOUS at 06:37

## 2020-07-04 RX ADMIN — INSULIN LISPRO SCH: 100 INJECTION, SOLUTION INTRAVENOUS; SUBCUTANEOUS at 21:10

## 2020-07-04 RX ADMIN — INSULIN LISPRO SCH: 100 INJECTION, SOLUTION INTRAVENOUS; SUBCUTANEOUS at 11:28

## 2020-07-04 RX ADMIN — INSULIN LISPRO SCH: 100 INJECTION, SOLUTION INTRAVENOUS; SUBCUTANEOUS at 16:47

## 2020-07-05 RX ADMIN — INSULIN LISPRO SCH: 100 INJECTION, SOLUTION INTRAVENOUS; SUBCUTANEOUS at 11:27

## 2020-07-05 RX ADMIN — INSULIN LISPRO SCH: 100 INJECTION, SOLUTION INTRAVENOUS; SUBCUTANEOUS at 21:11

## 2020-07-05 RX ADMIN — INSULIN LISPRO SCH: 100 INJECTION, SOLUTION INTRAVENOUS; SUBCUTANEOUS at 06:47

## 2020-07-05 RX ADMIN — INSULIN LISPRO SCH: 100 INJECTION, SOLUTION INTRAVENOUS; SUBCUTANEOUS at 16:31

## 2020-07-05 NOTE — PROGRESS NOTES
DATE:  07/04/2020



Covering for Dr. Joaquin M.D.



SUBJECTIVE:  The patient was interviewed.  Case was discussed with staff.  Chart

and records were reviewed.  Per the staff, the patient has not showered for over

1 week, potentially 8 days.  The patient was visited at bedside.  She is

disorganized.  She is angry, agitated.  She is refusing her medications.  She

has been refusing to shower.  Apparently staff prompted her to shower yesterday;

however, she attacked staff.  She is unable to cooperate at all with the

interview due to severity of her mood swings and anger.



MENTAL STATUS EXAMINATION:  The patient is lying in the hospital bed.  She is

malodorous.  She is disheveled.  She has poor eye contact, poor engagement,

selectively mute, angry appearing, disorganized thought process, unable to

assess for suicidal or homicidal thoughts and appears to be internally

preoccupied, appears to be paranoid and angry and suspicious.  Alert and

oriented x 2.  Insight, judgment and impulse control appear to be very poor at

this time.



ASSESSMENT AND PLAN:  The patient requires ongoing acute psychiatric

hospitalization given the severity of symptoms.  The patient is refusing her

medication.  However, she does have a R[place]e hearing scheduled for Monday.  We

attempted to review the risks, benefits and alternatives of the medication with

the patient; however, she is refusing to engage at all with the discussion and

feels that she does not need medications.  The patient is also refusing to

shower.  The patient's odor and stench appears to be critical for her health and

also is so severe that it is causing smell throughout the entire unit including

her room and causing agitation with her roommates.  Therefore, we will prompt

the patient again to shower and if the patient becomes agitated and attacked

staff, we will have to medicate emergently.  We will continue her current

medications as prescribed at this time.  Once again, we reviewed the risks,

benefits and alternatives, she is not engaging.  She is not willing to engage at

all.  She gets angry and not able to participate in the discussion.





DD: 07/04/2020 14:54

DT: 07/04/2020 16:15

JOB# [id number]  [id number]

## 2020-07-05 NOTE — PROGRESS NOTES
DATE:  07/05/2020



Covering for Dr. Nuñez.



SUBJECTIVE:  The patient was interviewed.  Case was discussed with staff.  Chart

and records were reviewed.  The patient continues to be disheveled.  She

continues to be guarded and suspicious that she is withdrawn and isolates to her

room and in her bed.  The patient refused to shower for several days in a row,

required staff assistance and prompting to shower.  She was very irritable.  She

was very agitated after being cleaned up and showered and she has required

emergent Ativan 1 mg x 1.  The patient this morning continues to remain in her

bed, suspicious, paranoid, not willing to cooperate at all.



MENTAL STATUS EXAMINATION:  The patient is lying in the hospital bed, improved

hygiene, but poorly cooperative.  Speech is mumbled and soft.  Mood and affect

appear to be angered and blunted.  Thought process appears to be somewhat loose.

 Unable to assess for any suicidal or homicidal thoughts.  The patient does

appear to be paranoid and internally preoccupied.  Insight, judgment and impulse

control appear to be quite poor at this time.



ASSESSMENT AND PLAN:  The patient requires ongoing acute psychiatric

hospitalization given the severity of her condition.  We will increase the

patient's Seroquel to 75 mg p.o. at bedtime.  No side effects have been noted. 

We will also encourage the patient to verbalize her needs.  Encourage the

patient to participate in group and milieu therapy.  Continue to attend to her

hygiene.





DD: 07/05/2020 12:10

DT: 07/05/2020 12:41

[facility]# [id number]  [id number]

## 2020-07-06 LAB
ANION GAP SERPL CALC-SCNC: 16 MMOL/L (ref 5–15)
BUN SERPL-MCNC: 25 MG/DL (ref 8–21)
CALCIUM SERPL-MCNC: 9.1 MG/DL (ref 8.4–10.2)
CHLORIDE SERPL-SCNC: 100 MMOL/L (ref 98–107)
CO2 SERPL-SCNC: 23 MMOL/L (ref 23–29)
CREAT SERPL-MCNC: 1.32 MG/DL (ref 0.7–1.3)
POTASSIUM SERPL-SCNC: 2.8 MMOL/L (ref 3.5–5.1)

## 2020-07-06 RX ADMIN — INSULIN LISPRO SCH: 100 INJECTION, SOLUTION INTRAVENOUS; SUBCUTANEOUS at 20:46

## 2020-07-06 RX ADMIN — POTASSIUM CHLORIDE SCH MEQ: 20 TABLET, EXTENDED RELEASE ORAL at 13:30

## 2020-07-06 RX ADMIN — INSULIN LISPRO SCH: 100 INJECTION, SOLUTION INTRAVENOUS; SUBCUTANEOUS at 17:34

## 2020-07-06 RX ADMIN — INSULIN LISPRO SCH: 100 INJECTION, SOLUTION INTRAVENOUS; SUBCUTANEOUS at 14:14

## 2020-07-06 RX ADMIN — POTASSIUM CHLORIDE SCH MEQ: 20 TABLET, EXTENDED RELEASE ORAL at 17:34

## 2020-07-06 RX ADMIN — INSULIN LISPRO SCH: 100 INJECTION, SOLUTION INTRAVENOUS; SUBCUTANEOUS at 06:43

## 2020-07-06 NOTE — PROGRESS NOTES
DATE:  07/06/2020



Case was discussed with staff of the patient, reviewed records.  The patient had

a R[place]e hearing today.  The patient refused to attend.  The patient continues to

be delusional.  Continues to have poor insight.  She is still refusing her

medication.  So far, she is still not taking her medication.  The [place]e hearing

was upheld by the  and the patient will be started on Haldol instead and we

so far discussed side effects; however, she is still rambling, continues to be

unable to make safe plan for self-care, easily agitated, delusional, and

paranoid.  I will continue outpatient group therapy, milieu therapy, adjust

medication as needed.





DD: 07/06/2020 14:12

DT: 07/06/2020 19:33

JOB# [id number]  [id number]

## 2020-07-06 NOTE — INTERNAL MEDICINE PROG NOTE
Internal Medicine Subjective





- Subjective


Service Date: 20


Patient seen and examined:: without staff


Patient is:: awake, interactive, in bed


Per staff patient has:: no adverse event, no episodes of fall (no fevers, no 

cough, no sob)





Internal Medicine Objective





- Results


Result Diagrams: 


 20 11:17





 20 11:17


Recent Labs: 


 Laboratory Last Values











WBC  11.3 K/uL (4.8-10.8)  H  20  11:17    


 


RBC  4.95 MIL/uL (4.2-6.2)   20  11:17    


 


Hgb  15.8 g/dL (12.0-16.0)   20  11:17    


 


Hct  46.3 % (36-48)   20  11:17    


 


MCV  94 fL (79.0-98.0)   20  11:17    


 


MCH  32 pg (27-31)  H  20  11:17    


 


MCHC  34 % (32-36)   20  11:17    


 


RDW  13.3 % (9.0-15.0)   20  11:17    


 


Plt Count  228 K/uL (130-430)   20  11:17    


 


MPV  10.7 fl (7.4-10.4)  H  20  11:17    


 


Neut % (Auto)  75.8 % (40-70)  H  20  11:17    


 


Lymph % (Auto)  15.2 % (20.5-51.5)  L  20  11:17    


 


Mono % (Auto)  7.3 % (1.7-9.3)   20  11:17    


 


Eos % (Auto)  1.2 % (0-4)   20  11:17    


 


Baso % (Auto)  0.5 % (0.0-2.0)   20  11:17    


 


Neut # (Auto)  8.5 K/uL (1.8-7.7)  H  20  11:17    


 


Lymph # (Auto)  1.7 K/uL (1.0-5.5)   20  11:17    


 


Mono # (Auto)  0.8 K/uL (0.0-1.0)   20  11:17    


 


Eos # (Auto)  0.1 K/uL (0.0-0.4)   20  11:17    


 


Baso # (Auto)  0.1 K/uL (0.0-0.2)   20  11:17    


 


Sodium  139 mmol/L (136-145)   20  11:17    


 


Potassium  3.1 mmol/L (3.5-5.1)  L  20  11:17    


 


Chloride  101 mmol/L ()   20  11:17    


 


Carbon Dioxide  21 mmol/L (23-29)  L  20  11:17    


 


Anion Gap  20  (5-15)  H  20  11:17    


 


BUN  20 mg/dL (8-21)   20  11:17    


 


Creatinine  1.03 mg/dL (0.70-1.30)   20  11:17    


 


Glucose  75 mg/dL (70-99)   20  11:17    


 


POC Glucose  100 MG/DL (70 - 105)   20  11:03    


 


Calcium  9.2 mg/dL (8.4-10.2)   20  11:17    


 


Total Bilirubin  0.9 mg/dL (0.0-1.0)   20  11:17    


 


AST  33 U/L (10-37)   20  11:17    


 


ALT  25 U/L (12-78)   20  11:17    


 


Alkaline Phosphatase  89 U/L ()   20  11:17    


 


Total Protein  7.6 g/dL (6.4-8.3)   20  11:17    


 


Albumin  3.8 g/dL (3.4-5.0)   20  11:17    


 


Triglycerides  100 mg/dL (<150)   20  12:16    


 


Cholesterol  152 mg/dL (<200)   20  12:16    


 


LDL Cholesterol Direct  90 mg/dL ()   20  12:16    


 


HDL Cholesterol  41 mg/dL (23-92)   20  12:16    


 


Urine Color  YELLOW  (YELLOW)   20  11:00    


 


Urine Clarity  HAZY  (CLEAR)  H  20  11:00    


 


Urine pH  6.0  (5.0-8.0)   20  11:00    


 


Ur Specific Gravity  >=1.030  (1.005-1.030)  H  20  11:00    


 


Urine Protein  1+  (NEGATIVE)  H  20  11:00    


 


Urine Ketones  3+  (NEGATIVE)  H  20  11:00    


 


Urine Blood  TRACE  (NEGATIVE)  H  20  11:00    


 


Urine Nitrate  NEGATIVE  (NEGATIVE)   20  11:00    


 


Urine Bilirubin  1+  (NEGATIVE)  H  20  11:00    


 


Urine Urobilinogen  0.2  (0.2-1.0)   20  11:00    


 


Ur Leukocyte Esterase  NEGATIVE  (NEGATIVE)   20  11:00    


 


Urine Glucose  NEGATIVE  (NEGATIVE)   20  11:00    


 


Coronavirus (PCR)  Negative  (Negative)   20  15:45    














- Physical Exam


Vitals and I&O: 


 Vital Signs











Temp  98.1 F   20 06:04


 


Pulse  101   20 06:04


 


Resp  18   20 08:00


 


BP  113/87   20 06:04


 


Pulse Ox  96   20 06:04








 Intake & Output











 20





 18:59 06:59 18:59


 


Intake Total 750 240 


 


Output Total  1 


 


Balance 750 239 


 


Intake:   


 


  Oral 750 240 


 


Output:   


 


  Urine/Stool Mix  1 


 


Other:   


 


  # Voids  1 











Active Medications: 


Current Medications





Acetaminophen (Tylenol)  650 mg PO Q4H PRN


   PRN Reason: Pain (Mild 1-3)


   Stop: 20 00:26


Al Hydrox/Mg Hydrox/Simethicone (Maalox)  30 ml PO Q4HR PRN


   PRN Reason: GI DISTRESS


   Stop: 20 00:26


Dextrose (Glutose 40%)  18.75 gm PO PRN PRN


   PRN Reason: BS Below 70 if tolerate po


   Stop: 20 01:51


Glucagon (Glucagen)  1 mg IM PRN PRN


   PRN Reason: BS Below 70 if not tolerate po


   Stop: 20 01:51


Insulin Human Lispro (Humalog Insulin Sliding Scale)  0 units SUBQ ACHS LARRY; 

Protocol


   Stop: 20 07:29


   Last Admin: 20 06:43 Dose:  Not Given


Lorazepam (Ativan)  0.5 mg PO Q4HR PRN; Protocol


   PRN Reason: Agitation


   Stop: 20 00:26


Magnesium Hydroxide (Milk Of Magnesia)  30 ml PO HS PRN


   PRN Reason: Constipation


Multivitamins/Vitamin C (Theragran)  1 tab PO DAILY LARRY


   Stop: 20 08:59


   Last Admin: 20 09:58 Dose:  Not Given


Quetiapine Fumarate (Seroquel)  75 mg PO HS LARRY; Protocol


   Stop: 20 20:59


   Last Admin: 20 21:12 Dose:  75 mg


Zolpidem Tartrate (Ambien)  5 mg PO HS PRN


   PRN Reason: Insomnia


   Stop: 20 00:26








General: weak


HEENT: NC/AT, PERRLA


Neck: Supple


Lungs: CTAB


Cardiovascular: RRR, Normal S1, Normal S2


Abdomen: soft


Extremities: clear





Internal Medicine Assmt/Plan





- Assessment


Assessment: 





1.  DM


2.  HTN


3.  Hypokalemia





- Plan


Plan: 








repeat BMP


continue sliding scale insulin


d/w r.n.


reviewed complete medical records





Nutritional Asmnt/Malnutr-PDOC





- Dietary Evaluation


Malnutrition Findings (Please click <Entered> for more info): 








Nutritional Asmnt/Malnutrition                             Start:  20 10:

33


Text:                                                      Status: Complete    

  


Freq:                                                                          

  


Protocol:                                                                      

  


 Document     20 10:33  CRESCENCIO  (Rec: 20 10:44  [id number]  CAIN-

[id number])


 Nutritional Asmnt/Malnutrition


     Patient General Information


      Nutritional Screening                      Low Risk


      Diagnosis                                  Psychosis


      Pertinent Medical Hx/Surgical Hx           DM, hiatal hernia


      Subjective Information                     Patient was transferred from


                                                 [facility].


                                                 Current diet order providing ~


                                                 2100 kcal, 74 gm protein, with


                                                 current diet intake of 0-25%


                                                 of meals, average intake ~525


                                                 kcal, 18gm protein.


      Current Diet Order/ Nutrition Support      Regular


      Patient / S.O                              Not Indicated


      Pertinent Medications                      maalox, Glucagon, Humalog, MOM


                                                 , Theragran


      Pertinent Labs                             () K 3.1


     Nutritional Hx/Data


      Height                                     1.63 m


      Height (Calculated Centimeters)            162.6


      Current Weight (lbs)                       84.368 kg


      Weight (Calculated Kilograms)              84.4


      Weight (Calculated Grams)                  14945.2


      Ideal Body Weight                          120


      % Ideal Body Weight                        155


      Body Mass Index (BMI)                      31.9


      Recent Weight Change                       No


      Weight Status                              Obese


     GI Symptoms


      GI Symptoms                                None


      Last BM                                    not noted


      Difficult in:                              None


      Food Allergies                             No


      Cultural/Ethnic/Moravian Belief           not indicated


      Usual diet at home                         unknown


      Skin Integrity/Comment:                    Dryness, Maksim 21


      Current %PO                                Negligible < 25%


     Estimated Nutritional Goals


      BEE in Kcals:                              Adj wt of IBW


      Calories/Kcals/Kg                          62kg adj BW 25-30 kcal/kg


      Kcals Calculated                           ~6612-2891 kcal/day


      Protein:                                   Adj wt of IBW


      Protein g/kgm/kg


      Protein Calculated                         ~60gm/day


      Fluid: ml                                  ~1007-3505 kcal/day


     Nutritional Problem


      1. Problem


       Problem                                   Inadequate oral intake related


                                                 to


       Etiology                                  poor appetite aeb


       Signs/Symptoms:                           PO intake <25% of meals


     Intervention/Recommendation


      Comments                                   1. Continue regular diet as


                                                 tolerated by patient.


                                                 2. Start Ensure Enlive TID for


                                                 added calories and protein.


     Expected Outcomes/Goals


      Expected Outcomes/Goals                    Oral intake >75% of meals,


                                                 weight stable or trend toward


                                                 IBW, nutrition related labs


                                                 WNL


                                                 F/U MR -

## 2020-07-07 LAB
ANION GAP SERPL CALC-SCNC: 11 MMOL/L (ref 5–15)
BUN SERPL-MCNC: 28 MG/DL (ref 8–21)
CALCIUM SERPL-MCNC: 9.9 MG/DL (ref 8.4–10.2)
CHLORIDE SERPL-SCNC: 103 MMOL/L (ref 98–107)
CO2 SERPL-SCNC: 28 MMOL/L (ref 23–29)
CREAT SERPL-MCNC: 1.25 MG/DL (ref 0.7–1.3)
POTASSIUM SERPL-SCNC: 3.6 MMOL/L (ref 3.5–5.1)

## 2020-07-07 RX ADMIN — INSULIN LISPRO SCH: 100 INJECTION, SOLUTION INTRAVENOUS; SUBCUTANEOUS at 16:54

## 2020-07-07 RX ADMIN — INSULIN LISPRO SCH: 100 INJECTION, SOLUTION INTRAVENOUS; SUBCUTANEOUS at 20:59

## 2020-07-07 RX ADMIN — INSULIN LISPRO SCH: 100 INJECTION, SOLUTION INTRAVENOUS; SUBCUTANEOUS at 06:41

## 2020-07-07 RX ADMIN — INSULIN LISPRO SCH: 100 INJECTION, SOLUTION INTRAVENOUS; SUBCUTANEOUS at 12:10

## 2020-07-07 NOTE — PROGRESS NOTES
DATE:     07/07/2020



Case was discussed with staff of the patient and reviewed records.  The patient

continues to be paranoid and delusional.  She told me that she is watching, she

is registering everything.  She continues to have poor insight.  She was riesed

yesterday and was started on Haldol and she has been getting injections if she

refuses oral medications.  The staff reports that now that we have her on reise

she is tending to take her medications.  I am not sure how long this will last. 

No side effects to the medication, no sedation, no nausea, and no extrapyramidal

symptoms.  She continues to be delusional and internally preoccupied.  She

continues to have poor insight and unable to tell me the date, where she is, why

she is here.  She believes she does not need to be here.  She has no mental

illness, that this is all fraudulent and that we are just trying to bill her

insurance.  We will continue to work with the patient in group therapy, milieu

therapy, and adjust medications as needed.





DD: 07/07/2020 08:24

DT: 07/07/2020 12:45

JOB# [id number]  [id number]

ISIDRA

## 2020-07-07 NOTE — INTERNAL MEDICINE PROG NOTE
Internal Medicine Subjective





- Subjective


Service Date: 20


Patient seen and examined:: without staff


Patient is:: awake, interactive, in bed


Per staff patient has:: no adverse event, no episodes of fall (no fevers, no 

cough, no sob)





Internal Medicine Objective





- Results


Result Diagrams: 


 20 11:17





 20 11:12


Recent Labs: 


 Laboratory Last Values











WBC  11.3 K/uL (4.8-10.8)  H  20  11:17    


 


RBC  4.95 MIL/uL (4.2-6.2)   20  11:17    


 


Hgb  15.8 g/dL (12.0-16.0)   20  11:17    


 


Hct  46.3 % (36-48)   20  11:17    


 


MCV  94 fL (79.0-98.0)   20  11:17    


 


MCH  32 pg (27-31)  H  20  11:17    


 


MCHC  34 % (32-36)   20  11:17    


 


RDW  13.3 % (9.0-15.0)   20  11:17    


 


Plt Count  228 K/uL (130-430)   20  11:17    


 


MPV  10.7 fl (7.4-10.4)  H  20  11:17    


 


Neut % (Auto)  75.8 % (40-70)  H  20  11:17    


 


Lymph % (Auto)  15.2 % (20.5-51.5)  L  20  11:17    


 


Mono % (Auto)  7.3 % (1.7-9.3)   20  11:17    


 


Eos % (Auto)  1.2 % (0-4)   20  11:17    


 


Baso % (Auto)  0.5 % (0.0-2.0)   20  11:17    


 


Neut # (Auto)  8.5 K/uL (1.8-7.7)  H  20  11:17    


 


Lymph # (Auto)  1.7 K/uL (1.0-5.5)   20  11:17    


 


Mono # (Auto)  0.8 K/uL (0.0-1.0)   20  11:17    


 


Eos # (Auto)  0.1 K/uL (0.0-0.4)   20  11:17    


 


Baso # (Auto)  0.1 K/uL (0.0-0.2)   20  11:17    


 


Sodium  136 mmol/L (136-145)   20  11:12    


 


Potassium  2.8 mmol/L (3.5-5.1)  L*  20  11:12    


 


Chloride  100 mmol/L ()   20  11:12    


 


Carbon Dioxide  23 mmol/L (23-29)   20  11:12    


 


Anion Gap  16  (5-15)  H  20  11:12    


 


BUN  25 mg/dL (8-21)  H  20  11:12    


 


Creatinine  1.32 mg/dL (0.70-1.30)  H  20  11:12    


 


Glucose  79 mg/dL (70-99)   20  11:12    


 


POC Glucose  85 MG/DL (70 - 105)   20  06:17    


 


Calcium  9.1 mg/dL (8.4-10.2)   20  11:12    


 


Total Bilirubin  0.9 mg/dL (0.0-1.0)   20  11:17    


 


AST  33 U/L (10-37)   20  11:17    


 


ALT  25 U/L (12-78)   20  11:17    


 


Alkaline Phosphatase  89 U/L ()   20  11:17    


 


Total Protein  7.6 g/dL (6.4-8.3)   20  11:17    


 


Albumin  3.8 g/dL (3.4-5.0)   20  11:17    


 


Triglycerides  100 mg/dL (<150)   20  12:16    


 


Cholesterol  152 mg/dL (<200)   20  12:16    


 


LDL Cholesterol Direct  90 mg/dL ()   20  12:16    


 


HDL Cholesterol  41 mg/dL (23-92)   20  12:16    


 


Urine Color  YELLOW  (YELLOW)   20  11:00    


 


Urine Clarity  HAZY  (CLEAR)  H  20  11:00    


 


Urine pH  6.0  (5.0-8.0)   20  11:00    


 


Ur Specific Gravity  >=1.030  (1.005-1.030)  H  20  11:00    


 


Urine Protein  1+  (NEGATIVE)  H  20  11:00    


 


Urine Ketones  3+  (NEGATIVE)  H  20  11:00    


 


Urine Blood  TRACE  (NEGATIVE)  H  20  11:00    


 


Urine Nitrate  NEGATIVE  (NEGATIVE)   20  11:00    


 


Urine Bilirubin  1+  (NEGATIVE)  H  20  11:00    


 


Urine Urobilinogen  0.2  (0.2-1.0)   20  11:00    


 


Ur Leukocyte Esterase  NEGATIVE  (NEGATIVE)   20  11:00    


 


Urine Glucose  NEGATIVE  (NEGATIVE)   20  11:00    


 


Coronavirus (PCR)  Negative  (Negative)   20  15:45    














- Physical Exam


Vitals and I&O: 


 Vital Signs











Temp  98.6 F   20 06:48


 


Pulse  106   20 06:48


 


Resp  19   20 06:48


 


BP  112/72   20 06:48


 


Pulse Ox  96   20 06:48








 Intake & Output











 20





 18:59 06:59 18:59


 


Intake Total 570 120 


 


Balance 570 120 


 


Intake:   


 


  Oral 450 120 


 


  Other 120  


 


Other:   


 


  # Voids 3 3 


 


  # Bowel Movements 0 0 











Active Medications: 


Current Medications





Acetaminophen (Tylenol)  650 mg PO Q4H PRN


   PRN Reason: Pain (Mild 1-3)


   Stop: 20 00:26


Al Hydrox/Mg Hydrox/Simethicone (Maalox)  30 ml PO Q4HR PRN


   PRN Reason: GI DISTRESS


   Stop: 20 00:26


Benztropine Mesylate (Cogentin)  0.5 mg PO BID LARRY


   Stop: 20 16:59


   Last Admin: 20 09:47 Dose:  0.5 mg


Benztropine Mesylate (Cogentin)  0.5 mg IM BID PRN


   PRN Reason: EPS (TO GIVE WITH HALDOL IM)


   Stop: 20 14:25


Dextrose (Glutose 40%)  18.75 gm PO PRN PRN


   PRN Reason: BS Below 70 if tolerate po


   Stop: 20 01:51


Glucagon (Glucagen)  1 mg IM PRN PRN


   PRN Reason: BS Below 70 if not tolerate po


   Stop: 20 01:51


Haloperidol (Haldol)  2 mg PO BID [facility]; Protocol


   Stop: 20 16:59


   Last Admin: 20 09:47 Dose:  2 mg


Haloperidol Lactate (Haldol)  2 mg IM BID PRN


   PRN Reason: Agitation


   Stop: 20 14:18


Insulin Human Lispro (Humalog Insulin Sliding Scale)  0 units SUBQ ACHS LARRY; 

Protocol


   Stop: 20 07:29


   Last Admin: 20 12:10 Dose:  Not Given


Lorazepam (Ativan)  0.5 mg PO Q4HR PRN; Protocol


   PRN Reason: Agitation


   Stop: 20 00:26


Magnesium Hydroxide (Milk Of Magnesia)  30 ml PO HS PRN


   PRN Reason: Constipation


Multivitamins/Vitamin C (Theragran)  1 tab PO DAILY LARRY


   Stop: 20 08:59


   Last Admin: 20 09:47 Dose:  1 tab


Quetiapine Fumarate 25 mg/ (Quetiapine Fumarate 50 mg)  75 mg PO HS LARRY


   Stop: 20 20:59


Zolpidem Tartrate (Ambien)  5 mg PO HS PRN


   PRN Reason: Insomnia


   Stop: 20 00:26








General: weak


HEENT: NC/AT, PERRLA


Neck: Supple


Lungs: CTAB


Cardiovascular: RRR, Normal S1, Normal S2


Abdomen: soft


Extremities: clear


Neurological: no change





Internal Medicine Assmt/Plan





- Assessment


Assessment: 





1.  DM


2.  HTN


3.  Hypokalemia





- Plan


Plan: 








repeat bmp


await results


kdur given x 2 doses


no obvious cause of hypokalemia


d/w r.n.


reviewed complete medical records





Nutritional Asmnt/Malnutr-PDOC





- Dietary Evaluation


Malnutrition Findings (Please click <Entered> for more info): 








Nutritional Asmnt/Malnutrition                             Start:  20 10:

33


Text:                                                      Status: Complete    

  


Freq:                                                                          

  


Protocol:                                                                      

  


 Document     20 10:33  CRESCENCIO  (Rec: 20 10:44  CRESCENCIO BARLOW-

[id number])


 Nutritional Asmnt/Malnutrition


     Patient General Information


      Nutritional Screening                      Low Risk


      Diagnosis                                  Psychosis


      Pertinent Medical Hx/Surgical Hx           DM, hiatal hernia


      Subjective Information                     Patient was transferred from


                                                 [facility].


                                                 Current diet order providing ~


                                                 2100 kcal, 74 gm protein, with


                                                 current diet intake of 0-25%


                                                 of meals, average intake ~525


                                                 kcal, 18gm protein.


      Current Diet Order/ Nutrition Support      Regular


      Patient / S.O                              Not Indicated


      Pertinent Medications                      maalox, Glucagon, Humalog, MOM


                                                 , Theragran


      Pertinent Labs                             () K 3.1


     Nutritional Hx/Data


      Height                                     1.63 m


      Height (Calculated Centimeters)            162.6


      Current Weight (lbs)                       84.368 kg


      Weight (Calculated Kilograms)              84.4


      Weight (Calculated Grams)                  14553.2


      Ideal Body Weight                          120


      % Ideal Body Weight                        155


      Body Mass Index (BMI)                      31.9


      Recent Weight Change                       No


      Weight Status                              Obese


     GI Symptoms


      GI Symptoms                                None


      Last BM                                    not noted


      Difficult in:                              None


      Food Allergies                             No


      Cultural/Ethnic/[place] Belief           not indicated


      Usual diet at home                         unknown


      Skin Integrity/Comment:                    Dryness, Maksim 21


      Current %PO                                Negligible < 25%


     Estimated Nutritional Goals


      BEE in Kcals:                              Adj wt of IBW


      Calories/Kcals/Kg                          62kg adj BW 25-30 kcal/kg


      Kcals Calculated                           ~1832-7039 kcal/day


      Protein:                                   Adj wt of IBW


      Protein g/kgm/kg


      Protein Calculated                         ~60gm/day


      Fluid: ml                                  ~4879-7109 kcal/day


     Nutritional Problem


      1. Problem


       Problem                                   Inadequate oral intake related


                                                 to


       Etiology                                  poor appetite aeb


       Signs/Symptoms:                           PO intake <25% of meals


     Intervention/Recommendation


      Comments                                   1. Continue regular diet as


                                                 tolerated by patient.


                                                 2. Start Ensure Enlive TID for


                                                 added calories and protein.


     Expected Outcomes/Goals


      Expected Outcomes/Goals                    Oral intake >75% of meals,


                                                 weight stable or trend toward


                                                 IBW, nutrition related labs


                                                 WNL


                                                 F/U MR -

## 2020-07-08 LAB — HBA1C BLD-MCNC: 5 G/DL

## 2020-07-08 RX ADMIN — INSULIN LISPRO SCH: 100 INJECTION, SOLUTION INTRAVENOUS; SUBCUTANEOUS at 11:35

## 2020-07-08 RX ADMIN — INSULIN LISPRO SCH: 100 INJECTION, SOLUTION INTRAVENOUS; SUBCUTANEOUS at 06:44

## 2020-07-08 RX ADMIN — INSULIN LISPRO SCH: 100 INJECTION, SOLUTION INTRAVENOUS; SUBCUTANEOUS at 16:10

## 2020-07-08 RX ADMIN — INSULIN LISPRO SCH: 100 INJECTION, SOLUTION INTRAVENOUS; SUBCUTANEOUS at 21:19

## 2020-07-08 NOTE — PROGRESS NOTES
DATE:  07/08/2020



Case was discussed with staff of the patient, reviewed records.  The patient

continues to be paranoid, continues to have poor insight, continues to be unable

to make safe plan for self-care; however, she started taking her medication. 

When I talked to the patient, she was sarcastic.  She was internally

preoccupied, paranoid attitude.  She believes that we are trying to poison her

with all these medication and so far no side effects to the medication, no

sedation, no nausea, no extrapyramidal symptoms.



MENTAL STATUS EXAMINATION:  The patient is appropriately dressed, not well

groomed.  Her affect is constricted.  Thoughts are concrete.  She is paranoid,

believing we are trying to poison her.  Unable to make safe plan for self-care. 

No side effects to medications, no sedation, no nausea, no extrapyramidal

symptoms.



ASSESSMENT:  Continues to be psychotic, continues need for hospitalization and

adjust on home medication.  We will continue the patient in group therapy,

milieu therapy, adjust medication as needed.





DD: 07/08/2020 08:17

DT: 07/08/2020 16:40

JOB# [id number]  [id number]

## 2020-07-08 NOTE — INTERNAL MEDICINE PROG NOTE
Internal Medicine Subjective





- Subjective


Service Date: 20


Patient seen and examined:: without staff


Patient is:: awake, interactive, in bed


Per staff patient has:: no adverse event, no episodes of fall (no fevers, no 

cough, no sob)





Internal Medicine Objective





- Results


Result Diagrams: 


 20 11:17





 20 15:21


Recent Labs: 


 Laboratory Last Values











WBC  11.3 K/uL (4.8-10.8)  H  20  11:17    


 


RBC  4.95 MIL/uL (4.2-6.2)   20  11:17    


 


Hgb  15.8 g/dL (12.0-16.0)   20  11:17    


 


Hct  46.3 % (36-48)   20  11:17    


 


MCV  94 fL (79.0-98.0)   20  11:17    


 


MCH  32 pg (27-31)  H  20  11:17    


 


MCHC  34 % (32-36)   20  11:17    


 


RDW  13.3 % (9.0-15.0)   20  11:17    


 


Plt Count  228 K/uL (130-430)   20  11:17    


 


MPV  10.7 fl (7.4-10.4)  H  20  11:17    


 


Neut % (Auto)  75.8 % (40-70)  H  20  11:17    


 


Lymph % (Auto)  15.2 % (20.5-51.5)  L  20  11:17    


 


Mono % (Auto)  7.3 % (1.7-9.3)   20  11:17    


 


Eos % (Auto)  1.2 % (0-4)   20  11:17    


 


Baso % (Auto)  0.5 % (0.0-2.0)   20  11:17    


 


Neut # (Auto)  8.5 K/uL (1.8-7.7)  H  20  11:17    


 


Lymph # (Auto)  1.7 K/uL (1.0-5.5)   20  11:17    


 


Mono # (Auto)  0.8 K/uL (0.0-1.0)   20  11:17    


 


Eos # (Auto)  0.1 K/uL (0.0-0.4)   20  11:17    


 


Baso # (Auto)  0.1 K/uL (0.0-0.2)   20  11:17    


 


Sodium  138 mmol/L (136-145)   20  15:21    


 


Potassium  3.6 mmol/L (3.5-5.1)   20  15:21    


 


Chloride  103 mmol/L ()   20  15:21    


 


Carbon Dioxide  28 mmol/L (23-29)   20  15:21    


 


Anion Gap  11  (5-15)   20  15:21    


 


BUN  28 mg/dL (8-21)  H  20  15:21    


 


Creatinine  1.25 mg/dL (0.70-1.30)   20  15:21    


 


Glucose  118 mg/dL (70-99)  H  20  15:21    


 


POC Glucose  116 MG/DL (70 - 105)  H  20  16:09    


 


Calcium  9.9 mg/dL (8.4-10.2)   20  15:21    


 


Total Bilirubin  0.9 mg/dL (0.0-1.0)   20  11:17    


 


AST  33 U/L (10-37)   20  11:17    


 


ALT  25 U/L (12-78)   20  11:17    


 


Alkaline Phosphatase  89 U/L ()   20  11:17    


 


Total Protein  7.6 g/dL (6.4-8.3)   20  11:17    


 


Albumin  3.8 g/dL (3.4-5.0)   20  11:17    


 


Triglycerides  100 mg/dL (<150)   20  12:16    


 


Cholesterol  152 mg/dL (<200)   20  12:16    


 


LDL Cholesterol Direct  90 mg/dL ()   20  12:16    


 


HDL Cholesterol  41 mg/dL (23-92)   20  12:16    


 


Urine Color  YELLOW  (YELLOW)   20  11:00    


 


Urine Clarity  HAZY  (CLEAR)  H  20  11:00    


 


Urine pH  6.0  (5.0-8.0)   20  11:00    


 


Ur Specific Gravity  >=1.030  (1.005-1.030)  H  20  11:00    


 


Urine Protein  1+  (NEGATIVE)  H  20  11:00    


 


Urine Ketones  3+  (NEGATIVE)  H  20  11:00    


 


Urine Blood  TRACE  (NEGATIVE)  H  20  11:00    


 


Urine Nitrate  NEGATIVE  (NEGATIVE)   20  11:00    


 


Urine Bilirubin  1+  (NEGATIVE)  H  20  11:00    


 


Urine Urobilinogen  0.2  (0.2-1.0)   20  11:00    


 


Ur Leukocyte Esterase  NEGATIVE  (NEGATIVE)   20  11:00    


 


Urine Glucose  NEGATIVE  (NEGATIVE)   20  11:00    


 


Coronavirus (PCR)  Negative  (Negative)   20  15:45    














- Physical Exam


Vitals and I&O: 


 Vital Signs











Temp  97.7 F   20 14:00


 


Pulse  99   20 14:00


 


Resp  18   20 14:00


 


BP  97/68   20 14:00


 


Pulse Ox  96   20 14:00








 Intake & Output











 20





 18:59 06:59 18:59


 


Intake Total 600 360 


 


Balance 600 360 


 


Intake:   


 


  Oral 600 360 


 


Other:   


 


  # Voids 4 3 











Active Medications: 


Current Medications





Acetaminophen (Tylenol)  650 mg PO Q4H PRN


   PRN Reason: Pain (Mild 1-3)


   Stop: 20 00:26


Al Hydrox/Mg Hydrox/Simethicone (Maalox)  30 ml PO Q4HR PRN


   PRN Reason: GI DISTRESS


   Stop: 20 00:26


Benztropine Mesylate (Cogentin)  0.5 mg PO BID LARRY


   Stop: 20 16:59


   Last Admin: 20 16:10 Dose:  0.5 mg


Benztropine Mesylate (Cogentin)  0.5 mg IM BID PRN


   PRN Reason: EPS (TO GIVE WITH HALDOL IM)


   Stop: 20 14:25


Dextrose (Glutose 40%)  18.75 gm PO PRN PRN


   PRN Reason: BS Below 70 if tolerate po


   Stop: 20 01:51


Glucagon (Glucagen)  1 mg IM PRN PRN


   PRN Reason: BS Below 70 if not tolerate po


   Stop: 20 01:51


Haloperidol (Haldol)  2 mg PO BID [facility]; Protocol


   Stop: 20 16:59


   Last Admin: 20 16:10 Dose:  2 mg


Haloperidol Lactate (Haldol)  2 mg IM BID PRN


   PRN Reason: Agitation


   Stop: 20 14:18


Insulin Human Lispro (Humalog Insulin Sliding Scale)  0 units SUBQ ACHS [facility]; 

Protocol


   Stop: 20 07:29


   Last Admin: 20 16:10 Dose:  Not Given


Lorazepam (Ativan)  0.5 mg PO Q4HR PRN; Protocol


   PRN Reason: Agitation


   Stop: 20 00:26


Magnesium Hydroxide (Milk Of Magnesia)  30 ml PO HS PRN


   PRN Reason: Constipation


Multivitamins/Vitamin C (Theragran)  1 tab PO DAILY [facility]


   Stop: 20 08:59


   Last Admin: 20 08:49 Dose:  1 tab


Quetiapine Fumarate 25 mg/ (Quetiapine Fumarate 50 mg)  75 mg PO HS LARRY


   Stop: 20 20:59


   Last Admin: 20 21:00 Dose:  75 mg


Zolpidem Tartrate (Ambien)  5 mg PO HS PRN


   PRN Reason: Insomnia


   Stop: 20 00:26








General: weak


HEENT: NC/AT, PERRLA


Neck: Supple


Lungs: CTAB


Cardiovascular: RRR, Normal S1, Normal S2


Abdomen: soft, +GT - discharge


Extremities: clear


Neurological: no change





Internal Medicine Assmt/Plan





- Assessment


Assessment: 





1.  DM


2.  HTN


3.  Hypokalemia





- Plan


Plan: 











d/w r.n.


reviewed complete medical records





Nutritional Asmnt/Malnutr-PDOC





- Dietary Evaluation


Malnutrition Findings (Please click <Entered> for more info): 








Nutritional Asmnt/Malnutrition                             Start:  20 10:

33


Text:                                                      Status: Complete    

  


Freq:                                                                          

  


Protocol:                                                                      

  


 Document     20 10:33  MMCESAR  (Rec: 20 10:44  MMCESAR BARLOW-

[id number])


 Nutritional Asmnt/Malnutrition


     Patient General Information


      Nutritional Screening                      Low Risk


      Diagnosis                                  Psychosis


      Pertinent Medical Hx/Surgical Hx           DM, hiatal hernia


      Subjective Information                     Patient was transferred from


                                                 [facility] [place].


                                                 Current diet order providing ~


                                                 2100 kcal, 74 gm protein, with


                                                 current diet intake of 0-25%


                                                 of meals, average intake ~525


                                                 kcal, 18gm protein.


      Current Diet Order/ Nutrition Support      Regular


      Patient / S.O                              Not Indicated


      Pertinent Medications                      maalox, Glucagon, Humalog, MOM


                                                 , Theragran


      Pertinent Labs                             () K 3.1


     Nutritional Hx/Data


      Height                                     1.63 m


      Height (Calculated Centimeters)            162.6


      Current Weight (lbs)                       84.368 kg


      Weight (Calculated Kilograms)              84.4


      Weight (Calculated Grams)                  30784.2


      Ideal Body Weight                          120


      % Ideal Body Weight                        155


      Body Mass Index (BMI)                      31.9


      Recent Weight Change                       No


      Weight Status                              Obese


     GI Symptoms


      GI Symptoms                                None


      Last BM                                    not noted


      Difficult in:                              None


      Food Allergies                             No


      Cultural/Ethnic/[place] Belief           not indicated


      Usual diet at home                         unknown


      Skin Integrity/Comment:                    Dryness, Maksim 21


      Current %PO                                Negligible < 25%


     Estimated Nutritional Goals


      BEE in Kcals:                              Adj wt of IBW


      Calories/Kcals/Kg                          62kg adj BW 25-30 kcal/kg


      Kcals Calculated                           ~7287-6647 kcal/day


      Protein:                                   Adj wt of IBW


      Protein g/kgm/kg


      Protein Calculated                         ~60gm/day


      Fluid: ml                                  ~9408-5064 kcal/day


     Nutritional Problem


      1. Problem


       Problem                                   Inadequate oral intake related


                                                 to


       Etiology                                  poor appetite aeb


       Signs/Symptoms:                           PO intake <25% of meals


     Intervention/Recommendation


      Comments                                   1. Continue regular diet as


                                                 tolerated by patient.


                                                 2. Start Ensure Enlive TID for


                                                 added calories and protein.


     Expected Outcomes/Goals


      Expected Outcomes/Goals                    Oral intake >75% of meals,


                                                 weight stable or trend toward


                                                 IBW, nutrition related labs


                                                 WNL


                                                 F/U MR -

## 2020-07-09 RX ADMIN — INSULIN LISPRO SCH: 100 INJECTION, SOLUTION INTRAVENOUS; SUBCUTANEOUS at 06:45

## 2020-07-09 RX ADMIN — HALOPERIDOL LACTATE SCH MG: 2 SOLUTION, CONCENTRATE ORAL at 16:21

## 2020-07-09 NOTE — INTERNAL MEDICINE PROG NOTE
Internal Medicine Subjective





- Subjective


Service Date: 20


Patient seen and examined:: without staff


Patient is:: awake, interactive, in bed


Per staff patient has:: no adverse event, no episodes of fall (no fevers, no 

cough, no sob)





Internal Medicine Objective





- Results


Result Diagrams: 


 20 11:17





 20 15:21


Recent Labs: 


 Laboratory Last Values











WBC  11.3 K/uL (4.8-10.8)  H  20  11:17    


 


RBC  4.95 MIL/uL (4.2-6.2)   20  11:17    


 


Hgb  15.8 g/dL (12.0-16.0)   20  11:17    


 


Hct  46.3 % (36-48)   20  11:17    


 


MCV  94 fL (79.0-98.0)   20  11:17    


 


MCH  32 pg (27-31)  H  20  11:17    


 


MCHC  34 % (32-36)   20  11:17    


 


RDW  13.3 % (9.0-15.0)   20  11:17    


 


Plt Count  228 K/uL (130-430)   20  11:17    


 


MPV  10.7 fl (7.4-10.4)  H  20  11:17    


 


Neut % (Auto)  75.8 % (40-70)  H  20  11:17    


 


Lymph % (Auto)  15.2 % (20.5-51.5)  L  20  11:17    


 


Mono % (Auto)  7.3 % (1.7-9.3)   20  11:17    


 


Eos % (Auto)  1.2 % (0-4)   20  11:17    


 


Baso % (Auto)  0.5 % (0.0-2.0)   20  11:17    


 


Neut # (Auto)  8.5 K/uL (1.8-7.7)  H  20  11:17    


 


Lymph # (Auto)  1.7 K/uL (1.0-5.5)   20  11:17    


 


Mono # (Auto)  0.8 K/uL (0.0-1.0)   20  11:17    


 


Eos # (Auto)  0.1 K/uL (0.0-0.4)   20  11:17    


 


Baso # (Auto)  0.1 K/uL (0.0-0.2)   20  11:17    


 


Sodium  138 mmol/L (136-145)   20  15:21    


 


Potassium  3.6 mmol/L (3.5-5.1)   20  15:21    


 


Chloride  103 mmol/L ()   20  15:21    


 


Carbon Dioxide  28 mmol/L (23-29)   20  15:21    


 


Anion Gap  11  (5-15)   20  15:21    


 


BUN  28 mg/dL (8-21)  H  20  15:21    


 


Creatinine  1.25 mg/dL (0.70-1.30)   20  15:21    


 


Glucose  118 mg/dL (70-99)  H  20  15:21    


 


POC Glucose  98 MG/DL (70 - 105)   20  06:27    


 


Calcium  9.9 mg/dL (8.4-10.2)   20  15:21    


 


Total Bilirubin  0.9 mg/dL (0.0-1.0)   20  11:17    


 


AST  33 U/L (10-37)   20  11:17    


 


ALT  25 U/L (12-78)   20  11:17    


 


Alkaline Phosphatase  89 U/L ()   20  11:17    


 


Total Protein  7.6 g/dL (6.4-8.3)   20  11:17    


 


Albumin  3.8 g/dL (3.4-5.0)   20  11:17    


 


Triglycerides  100 mg/dL (<150)   20  12:16    


 


Cholesterol  152 mg/dL (<200)   20  12:16    


 


LDL Cholesterol Direct  90 mg/dL ()   20  12:16    


 


HDL Cholesterol  41 mg/dL (23-92)   20  12:16    


 


Urine Color  YELLOW  (YELLOW)   20  11:00    


 


Urine Clarity  HAZY  (CLEAR)  H  20  11:00    


 


Urine pH  6.0  (5.0-8.0)   20  11:00    


 


Ur Specific Gravity  >=1.030  (1.005-1.030)  H  20  11:00    


 


Urine Protein  1+  (NEGATIVE)  H  20  11:00    


 


Urine Ketones  3+  (NEGATIVE)  H  20  11:00    


 


Urine Blood  TRACE  (NEGATIVE)  H  20  11:00    


 


Urine Nitrate  NEGATIVE  (NEGATIVE)   20  11:00    


 


Urine Bilirubin  1+  (NEGATIVE)  H  20  11:00    


 


Urine Urobilinogen  0.2  (0.2-1.0)   20  11:00    


 


Ur Leukocyte Esterase  NEGATIVE  (NEGATIVE)   20  11:00    


 


Urine Glucose  NEGATIVE  (NEGATIVE)   20  11:00    


 


Coronavirus (PCR)  Negative  (Negative)   20  15:45    














- Physical Exam


Vitals and I&O: 


 Vital Signs











Temp  97.4 F   20 06:19


 


Pulse  91   20 06:19


 


Resp  17   20 08:00


 


BP  98/66   20 06:19


 


Pulse Ox  96   20 06:19








 Intake & Output











 20





 18:59 06:59 18:59


 


Intake Total 900 240 


 


Balance 900 240 


 


Intake:   


 


  Oral 900 240 


 


Other:   


 


  # Voids 3 2 


 


  # Bowel Movements 0 0 











Active Medications: 


Current Medications





Acetaminophen (Tylenol)  650 mg PO Q4H PRN


   PRN Reason: Pain (Mild 1-3)


   Stop: 20 00:26


Al Hydrox/Mg Hydrox/Simethicone (Maalox)  30 ml PO Q4HR PRN


   PRN Reason: GI DISTRESS


   Stop: 20 00:26


Benztropine Mesylate (Cogentin)  0.5 mg PO BID LARR[facility]


   Stop: 20 16:59


   Last Admin: 20 08:16 Dose:  0.5 mg


Benztropine Mesylate (Cogentin)  0.5 mg IM BID PRN


   PRN Reason: EPS (TO GIVE WITH HALDOL IM)


   Stop: 20 14:25


Dextrose (Glutose 40%)  18.75 gm PO PRN PRN


   PRN Reason: BS Below 70 if tolerate po


   Stop: 20 01:51


Glucagon (Glucagen)  1 mg IM PRN PRN


   PRN Reason: BS Below 70 if not tolerate po


   Stop: 20 01:51


Haloperidol Lactate (Haldol)  4 mg PO BID [facility]; Protocol


   Stop: 20 16:59


Haloperidol Lactate (Haldol)  4 mg IM BID PRN


   PRN Reason: Agitation if pt refused po


   Stop: 20 14:18


Insulin Human Lispro (Humalog Insulin Sliding Scale)  0 units SUBQ QDAC [facility]; 

Protocol


   Stop: 20 07:29


Lorazepam (Ativan)  0.5 mg PO Q4HR PRN; Protocol


   PRN Reason: Agitation


   Stop: 20 00:26


Magnesium Hydroxide (Milk Of Magnesia)  30 ml PO HS PRN


   PRN Reason: Constipation


Multivitamins/Vitamin C (Theragran)  1 tab PO DAILY LARRY


   Stop: 20 08:59


   Last Admin: 20 08:16 Dose:  1 tab


Quetiapine Fumarate 25 mg/ (Quetiapine Fumarate 50 mg)  75 mg PO HS LARRY


   Stop: 20 20:59


   Last Admin: 20 21:19 Dose:  75 mg


Zolpidem Tartrate (Ambien)  5 mg PO HS PRN


   PRN Reason: Insomnia


   Stop: 20 00:26


   Last Admin: 20 21:20 Dose:  5 mg








General: weak


HEENT: NC/AT, PERRLA


Neck: Supple


Lungs: CTAB


Cardiovascular: RRR, Normal S1, Normal S2


Abdomen: soft, +GT - discharge


Extremities: clear


Neurological: no change





Internal Medicine Assmt/Plan





- Assessment


Assessment: 





1.  DM


2.  HTN


3.  Hypokalemia





- Plan


Plan: 





continue supportive care


check bmp for hypokalemia


d/w r.n.


reviewed complete medical records





Nutritional Asmnt/Malnutr-PDOC





- Dietary Evaluation


Malnutrition Findings (Please click <Entered> for more info): 








Nutritional Asmnt/Malnutrition                             Start:  20 10:

33


Text:                                                      Status: Complete    

  


Freq:                                                                          

  


Protocol:                                                                      

  


 Document     20 10:33  CRESCENCIO  (Rec: 20 10:44  CRESCENCIO BARLOW-

CTXTS-02)


 Nutritional Asmnt/Malnutrition


     Patient General Information


      Nutritional Screening                      Low Risk


      Diagnosis                                  Psychosis


      Pertinent Medical Hx/Surgical Hx           DM, hiatal hernia


      Subjective Information                     Patient was transferred from


                                                 [facility].


                                                 Current diet order providing ~


                                                 2100 kcal, 74 gm protein, with


                                                 current diet intake of 0-25%


                                                 of meals, average intake ~525


                                                 kcal, 18gm protein.


      Current Diet Order/ Nutrition Support      Regular


      Patient / S.O                              Not Indicated


      Pertinent Medications                      maalox, Glucagon, Humalog, MOM


                                                 , Theragran


      Pertinent Labs                             () K 3.1


     Nutritional Hx/Data


      Height                                     1.63 m


      Height (Calculated Centimeters)            162.6


      Current Weight (lbs)                       84.368 kg


      Weight (Calculated Kilograms)              84.4


      Weight (Calculated Grams)                  37199.2


      Ideal Body Weight                          120


      % Ideal Body Weight                        155


      Body Mass Index (BMI)                      31.9


      Recent Weight Change                       No


      Weight Status                              Obese


     GI Symptoms


      GI Symptoms                                None


      Last BM                                    not noted


      Difficult in:                              None


      Food Allergies                             No


      Cultural/Ethnic/Tenriism Belief           not indicated


      Usual diet at home                         unknown


      Skin Integrity/Comment:                    Dryness, Maksim 21


      Current %PO                                Negligible < 25%


     Estimated Nutritional Goals


      BEE in Kcals:                              Adj wt of IBW


      Calories/Kcals/Kg                          62kg adj BW 25-30 kcal/kg


      Kcals Calculated                           ~7360-6712 kcal/day


      Protein:                                   Adj wt of IBW


      Protein g/kgm/kg


      Protein Calculated                         ~60gm/day


      Fluid: ml                                  ~3603-5273 kcal/day


     Nutritional Problem


      1. Problem


       Problem                                   Inadequate oral intake related


                                                 to


       Etiology                                  poor appetite aeb


       Signs/Symptoms:                           PO intake <25% of meals


     Intervention/Recommendation


      Comments                                   1. Continue regular diet as


                                                 tolerated by patient.


                                                 2. Start Ensure Enlive TID for


                                                 added calories and protein.


     Expected Outcomes/Goals


      Expected Outcomes/Goals                    Oral intake >75% of meals,


                                                 weight stable or trend toward


                                                 IBW, nutrition related labs


                                                 WNL


                                                 F/U MR -

## 2020-07-10 RX ADMIN — HALOPERIDOL LACTATE SCH MG: 2 SOLUTION, CONCENTRATE ORAL at 16:08

## 2020-07-10 RX ADMIN — INSULIN LISPRO SCH: 100 INJECTION, SOLUTION INTRAVENOUS; SUBCUTANEOUS at 06:39

## 2020-07-10 RX ADMIN — HALOPERIDOL LACTATE SCH MG: 2 SOLUTION, CONCENTRATE ORAL at 08:21

## 2020-07-10 NOTE — INTERNAL MEDICINE PROG NOTE
Internal Medicine Subjective





- Subjective


Service Date: 07/10/20 (Late entry)


Patient is:: awake, interactive, in bed


Per staff patient has:: no adverse event, no episodes of fall (no fevers, no 

cough, no sob)





Internal Medicine Objective





- Results


Result Diagrams: 


 20 11:17





 20 15:21


Recent Labs: 


 Laboratory Last Values











WBC  11.3 K/uL (4.8-10.8)  H  20  11:17    


 


RBC  4.95 MIL/uL (4.2-6.2)   20  11:17    


 


Hgb  15.8 g/dL (12.0-16.0)   20  11:17    


 


Hct  46.3 % (36-48)   20  11:17    


 


MCV  94 fL (79.0-98.0)   20  11:17    


 


MCH  32 pg (27-31)  H  20  11:17    


 


MCHC  34 % (32-36)   20  11:17    


 


RDW  13.3 % (9.0-15.0)   20  11:17    


 


Plt Count  228 K/uL (130-430)   20  11:17    


 


MPV  10.7 fl (7.4-10.4)  H  20  11:17    


 


Neut % (Auto)  75.8 % (40-70)  H  20  11:17    


 


Lymph % (Auto)  15.2 % (20.5-51.5)  L  20  11:17    


 


Mono % (Auto)  7.3 % (1.7-9.3)   20  11:17    


 


Eos % (Auto)  1.2 % (0-4)   20  11:17    


 


Baso % (Auto)  0.5 % (0.0-2.0)   20  11:17    


 


Neut # (Auto)  8.5 K/uL (1.8-7.7)  H  20  11:17    


 


Lymph # (Auto)  1.7 K/uL (1.0-5.5)   20  11:17    


 


Mono # (Auto)  0.8 K/uL (0.0-1.0)   20  11:17    


 


Eos # (Auto)  0.1 K/uL (0.0-0.4)   20  11:17    


 


Baso # (Auto)  0.1 K/uL (0.0-0.2)   20  11:17    


 


Sodium  138 mmol/L (136-145)   20  15:21    


 


Potassium  3.6 mmol/L (3.5-5.1)   20  15:21    


 


Chloride  103 mmol/L ()   20  15:21    


 


Carbon Dioxide  28 mmol/L (23-29)   20  15:21    


 


Anion Gap  11  (5-15)   20  15:21    


 


BUN  28 mg/dL (8-21)  H  20  15:21    


 


Creatinine  1.25 mg/dL (0.70-1.30)   20  15:21    


 


Glucose  118 mg/dL (70-99)  H  20  15:21    


 


POC Glucose  102 MG/DL (70 - 105)   07/10/20  06:29    


 


Calcium  9.9 mg/dL (8.4-10.2)   20  15:21    


 


Total Bilirubin  0.9 mg/dL (0.0-1.0)   20  11:17    


 


AST  33 U/L (10-37)   20  11:17    


 


ALT  25 U/L (12-78)   20  11:17    


 


Alkaline Phosphatase  89 U/L ()   20  11:17    


 


Total Protein  7.6 g/dL (6.4-8.3)   20  11:17    


 


Albumin  3.8 g/dL (3.4-5.0)   20  11:17    


 


Triglycerides  100 mg/dL (<150)   20  12:16    


 


Cholesterol  152 mg/dL (<200)   20  12:16    


 


LDL Cholesterol Direct  90 mg/dL ()   20  12:16    


 


HDL Cholesterol  41 mg/dL (23-92)   20  12:16    


 


Urine Color  YELLOW  (YELLOW)   20  11:00    


 


Urine Clarity  HAZY  (CLEAR)  H  20  11:00    


 


Urine pH  6.0  (5.0-8.0)   20  11:00    


 


Ur Specific Gravity  >=1.030  (1.005-1.030)  H  20  11:00    


 


Urine Protein  1+  (NEGATIVE)  H  20  11:00    


 


Urine Ketones  3+  (NEGATIVE)  H  20  11:00    


 


Urine Blood  TRACE  (NEGATIVE)  H  20  11:00    


 


Urine Nitrate  NEGATIVE  (NEGATIVE)   20  11:00    


 


Urine Bilirubin  1+  (NEGATIVE)  H  20  11:00    


 


Urine Urobilinogen  0.2  (0.2-1.0)   20  11:00    


 


Ur Leukocyte Esterase  NEGATIVE  (NEGATIVE)   20  11:00    


 


Urine Glucose  NEGATIVE  (NEGATIVE)   20  11:00    


 


Coronavirus (PCR)  Negative  (Negative)   20  15:45    














- Physical Exam


Vitals and I&O: 


 Vital Signs











Temp  97.2 F   20 20:33


 


Pulse  90   20 20:33


 


Resp  17   07/10/20 08:00


 


BP  107/59   20 20:33


 


Pulse Ox  95   20 20:33








 Intake & Output











 07/09/20 07/10/20 07/10/20





 18:59 06:59 18:59


 


Intake Total 960 240 


 


Balance 960 240 


 


Intake:   


 


  Oral 960 240 


 


Other:   


 


  # Voids 3 2 


 


  # Bowel Movements 0 0 











Active Medications: 


Current Medications





Acetaminophen (Tylenol)  650 mg PO Q4H PRN


   PRN Reason: Pain (Mild 1-3)


   Stop: 20 00:26


Al Hydrox/Mg Hydrox/Simethicone (Maalox)  30 ml PO Q4HR PRN


   PRN Reason: GI DISTRESS


   Stop: 20 00:26


Benztropine Mesylate (Cogentin)  0.5 mg PO BID LARRY


   Stop: 20 16:59


   Last Admin: 07/10/20 08:21 Dose:  0.5 mg


Benztropine Mesylate (Cogentin)  0.5 mg IM BID PRN


   PRN Reason: EPS (TO GIVE WITH HALDOL IM)


   Stop: 20 14:25


Dextrose (Glutose 40%)  18.75 gm PO PRN PRN


   PRN Reason: BS Below 70 if tolerate po


   Stop: 20 01:51


Glucagon (Glucagen)  1 mg IM PRN PRN


   PRN Reason: BS Below 70 if not tolerate po


   Stop: 20 01:51


Haloperidol Lactate (Haldol)  4 mg PO BID [facility]; Protocol


   Stop: 20 16:59


   Last Admin: 07/10/20 08:21 Dose:  4 mg


Haloperidol Lactate (Haldol)  4 mg IM BID PRN


   PRN Reason: Agitation if pt refused po


   Stop: 20 14:18


Insulin Human Lispro (Humalog Insulin Sliding Scale)  0 units SUBQ QDAC [facility]; 

Protocol


   Stop: 20 07:29


   Last Admin: 07/10/20 06:39 Dose:  Not Given


Lorazepam (Ativan)  0.5 mg PO Q4HR PRN; Protocol


   PRN Reason: Agitation


   Stop: 20 00:26


Magnesium Hydroxide (Milk Of Magnesia)  30 ml PO HS PRN


   PRN Reason: Constipation


Multivitamins/Vitamin C (Theragran)  1 tab PO DAILY LARRY


   Stop: 20 08:59


   Last Admin: 07/10/20 08:21 Dose:  1 tab


Quetiapine Fumarate 25 mg/ (Quetiapine Fumarate 50 mg)  75 mg PO HS LARRY


   Stop: 20 20:59


   Last Admin: 20 21:27 Dose:  75 mg


Zolpidem Tartrate (Ambien)  5 mg PO HS PRN


   PRN Reason: Insomnia


   Stop: 20 00:26


   Last Admin: 20 21:20 Dose:  5 mg








General: weak


HEENT: NC/AT, PERRLA


Neck: Supple


Lungs: CTAB


Cardiovascular: RRR, Normal S1, Normal S2


Abdomen: soft, +GT - discharge


Extremities: clear


Neurological: no change





Internal Medicine Assmt/Plan





- Assessment


Assessment: 





1.  DM


2.  HTN





- Plan


Plan: 





await results


d/w r.n.


reviewed complete medical records





Nutritional Asmnt/Malnutr-PDOC





- Dietary Evaluation


Malnutrition Findings (Please click <Entered> for more info): 








Nutritional Asmnt/Malnutrition                             Start:  20 10:

33


Text:                                                      Status: Complete    

  


Freq:                                                                          

  


Protocol:                                                                      

  


 Document     20 10:33  CRESCENCIO  (Rec: 20 10:44  CRESCENCIO BARLOW-

CTXTS-02)


 Nutritional Asmnt/Malnutrition


     Patient General Information


      Nutritional Screening                      Low Risk


      Diagnosis                                  Psychosis


      Pertinent Medical Hx/Surgical Hx           DM, hiatal hernia


      Subjective Information                     Patient was transferred from


                                                 [facility].


                                                 Current diet order providing ~


                                                 2100 kcal, 74 gm protein, with


                                                 current diet intake of 0-25%


                                                 of meals, average intake ~525


                                                 kcal, 18gm protein.


      Current Diet Order/ Nutrition Support      Regular


      Patient / S.O                              Not Indicated


      Pertinent Medications                      maalox, Glucagon, Humalog, MOM


                                                 , Theragran


      Pertinent Labs                             () K 3.1


     Nutritional Hx/Data


      Height                                     1.63 m


      Height (Calculated Centimeters)            162.6


      Current Weight (lbs)                       84.368 kg


      Weight (Calculated Kilograms)              84.4


      Weight (Calculated Grams)                  43643.2


      Ideal Body Weight                          120


      % Ideal Body Weight                        155


      Body Mass Index (BMI)                      31.9


      Recent Weight Change                       No


      Weight Status                              Obese


     GI Symptoms


      GI Symptoms                                None


      Last BM                                    not noted


      Difficult in:                              None


      Food Allergies                             No


      Cultural/Ethnic/B[employer]ism Belief           not indicated


      Usual diet at home                         unknown


      Skin Integrity/Comment:                    Dryness, Maksim 21


      Current %PO                                Negligible < 25%


     Estimated Nutritional Goals


      BEE in Kcals:                              Adj wt of IBW


      Calories/Kcals/Kg                          62kg adj BW 25-30 kcal/kg


      Kcals Calculated                           ~5270-9608 kcal/day


      Protein:                                   Adj wt of IBW


      Protein g/kgm/kg


      Protein Calculated                         ~60gm/day


      Fluid: ml                                  ~9302-0926 kcal/day


     Nutritional Problem


      1. Problem


       Problem                                   Inadequate oral intake related


                                                 to


       Etiology                                  poor appetite aeb


       Signs/Symptoms:                           PO intake <25% of meals


     Intervention/Recommendation


      Comments                                   1. Continue regular diet as


                                                 tolerated by patient.


                                                 2. Start Ensure Enlive TID for


                                                 added calories and protein.


     Expected Outcomes/Goals


      Expected Outcomes/Goals                    Oral intake >75% of meals,


                                                 weight stable or trend toward


                                                 IBW, nutrition related labs


                                                 WNL


                                                 F/U MR -

## 2020-07-10 NOTE — PROGRESS NOTES
DATE:     07/10/2020



Case was discussed with staff of the patient, reviewed records.  The patient

continues to be delusional, internally preoccupied.  Continues to stay to

herself.  Continues to be unable to make safe plan for her self-care, paranoid,

delusional.  She reports she is going to report this to the FBI .  No side

effects with the medication, no sedation, no nausea, no extrapyramidal symptoms.

 I increased her Haldol yesterday to 4 mg twice a day.  We will continue

outpatient group therapy, milieu therapy, and adjust medications as needed.





DD: 07/10/2020 11:31

DT: 07/10/2020 13:43

JOB# [id number]  [id number]

ISIDRA

## 2020-07-11 LAB
ANION GAP SERPL CALC-SCNC: 12 MMOL/L (ref 5–15)
BUN SERPL-MCNC: 33 MG/DL (ref 8–21)
CALCIUM SERPL-MCNC: 9.7 MG/DL (ref 8.4–10.2)
CHLORIDE SERPL-SCNC: 104 MMOL/L (ref 98–107)
CO2 SERPL-SCNC: 29 MMOL/L (ref 23–29)
CREAT SERPL-MCNC: 1.41 MG/DL (ref 0.7–1.3)
POTASSIUM SERPL-SCNC: 3.9 MMOL/L (ref 3.5–5.1)

## 2020-07-11 RX ADMIN — HALOPERIDOL LACTATE SCH MG: 2 SOLUTION, CONCENTRATE ORAL at 10:10

## 2020-07-11 RX ADMIN — HALOPERIDOL LACTATE SCH MG: 2 SOLUTION, CONCENTRATE ORAL at 10:12

## 2020-07-11 RX ADMIN — INSULIN LISPRO SCH: 100 INJECTION, SOLUTION INTRAVENOUS; SUBCUTANEOUS at 06:29

## 2020-07-11 RX ADMIN — HALOPERIDOL LACTATE SCH MG: 2 SOLUTION, CONCENTRATE ORAL at 17:16

## 2020-07-11 NOTE — PROGRESS NOTES
DATE:  07/11/2020



SUBJECTIVE:  The patient was seen, chart reviewed, and discussed with staff. 

The patient continues to be actively psychotic, responding to internal stimuli,

remains very paranoid.  Continues to think FBI and government officials were

after her.  She has been compliant with her medications, denying any side

effects.  We will continue the patient on current medications and titrate as

needed.





DD: 07/11/2020 15:26

DT: 07/11/2020 16:06

[facility]# [id number]  [id number]

## 2020-07-12 RX ADMIN — INSULIN LISPRO SCH: 100 INJECTION, SOLUTION INTRAVENOUS; SUBCUTANEOUS at 06:38

## 2020-07-12 RX ADMIN — HALOPERIDOL LACTATE SCH MG: 2 SOLUTION, CONCENTRATE ORAL at 09:37

## 2020-07-12 RX ADMIN — HALOPERIDOL LACTATE SCH MG: 2 SOLUTION, CONCENTRATE ORAL at 17:23

## 2020-07-12 NOTE — PROGRESS NOTES
DATE:  



SUBJECTIVE:  The patient was seen, chart reviewed, and discussed with staff. 

The patient continues to be disoriented, confused and paranoid, continues to

feel that government agencies are spying on her.  She has, however, been

compliant with medications, denying any side effects.



PLAN:  The patient continues to be disoriented, confused and paranoid, so that

she will require inpatient care facility and treatment.  We will monitor on a

daily basis for response to medications and titrate medications as needed.





DD: 07/12/2020 15:50

DT: 07/12/2020 16:18

JOB# [id number]  [id number]

## 2020-07-13 RX ADMIN — HALOPERIDOL LACTATE SCH MG: 2 SOLUTION, CONCENTRATE ORAL at 16:32

## 2020-07-13 RX ADMIN — INSULIN LISPRO SCH: 100 INJECTION, SOLUTION INTRAVENOUS; SUBCUTANEOUS at 06:47

## 2020-07-13 RX ADMIN — HALOPERIDOL LACTATE SCH MG: 2 SOLUTION, CONCENTRATE ORAL at 08:26

## 2020-07-13 NOTE — PROGRESS NOTES
DATE:     07/13/2020



Case was discussed with staff of the patient, reviewed records.  She is

agreeable to take oral medications.  The patient is calmer.  She is less

delusional; however, .  She is still

somewhat paranoid, continues to be unable to make safe plan for self-care. 

Continues to have poor insight in general, but in general, she is showing

progress.  She is less collazo, less irritable, less angry.  No side effects with

the medication, no sedation, no nausea, no extrapyramidal symptoms.  I will be

increasing her Seroquel to 100 mg at bedtime and she also on Haldol and will be

getting injections if she refuses oral medication.  We will continue the patient

in group therapy, milieu therapy, and adjust the medications as needed.





DD: 07/13/2020 12:39

DT: 07/13/2020 19:37

JOB# [id number]  [id number]

ISIDRA

## 2020-07-13 NOTE — INTERNAL MEDICINE PROG NOTE
Internal Medicine Subjective





- Subjective


Service Date: 20


Patient seen and examined:: without staff


Patient is:: awake, interactive, in bed


Per staff patient has:: no adverse event, no episodes of fall (no fevers, no 

cough, no sob)





Internal Medicine Objective





- Results


Result Diagrams: 


 20 11:17





 20 05:39


Recent Labs: 


 Laboratory Last Values











WBC  11.3 K/uL (4.8-10.8)  H  20  11:17    


 


RBC  4.95 MIL/uL (4.2-6.2)   20  11:17    


 


Hgb  15.8 g/dL (12.0-16.0)   20  11:17    


 


Hct  46.3 % (36-48)   20  11:17    


 


MCV  94 fL (79.0-98.0)   20  11:17    


 


MCH  32 pg (27-31)  H  20  11:17    


 


MCHC  34 % (32-36)   20  11:17    


 


RDW  13.3 % (9.0-15.0)   20  11:17    


 


Plt Count  228 K/uL (130-430)   20  11:17    


 


MPV  10.7 fl (7.4-10.4)  H  20  11:17    


 


Neut % (Auto)  75.8 % (40-70)  H  20  11:17    


 


Lymph % (Auto)  15.2 % (20.5-51.5)  L  20  11:17    


 


Mono % (Auto)  7.3 % (1.7-9.3)   20  11:17    


 


Eos % (Auto)  1.2 % (0-4)   20  11:17    


 


Baso % (Auto)  0.5 % (0.0-2.0)   20  11:17    


 


Neut # (Auto)  8.5 K/uL (1.8-7.7)  H  20  11:17    


 


Lymph # (Auto)  1.7 K/uL (1.0-5.5)   20  11:17    


 


Mono # (Auto)  0.8 K/uL (0.0-1.0)   20  11:17    


 


Eos # (Auto)  0.1 K/uL (0.0-0.4)   20  11:17    


 


Baso # (Auto)  0.1 K/uL (0.0-0.2)   20  11:17    


 


Sodium  141 mmol/L (136-145)   20  05:39    


 


Potassium  3.9 mmol/L (3.5-5.1)   20  05:39    


 


Chloride  104 mmol/L ()   20  05:39    


 


Carbon Dioxide  29 mmol/L (23-29)   20  05:39    


 


Anion Gap  12  (5-15)   20  05:39    


 


BUN  33 mg/dL (8-21)  H  20  05:39    


 


Creatinine  1.41 mg/dL (0.70-1.30)  H  20  05:39    


 


Glucose  98 mg/dL (70-99)   20  05:39    


 


POC Glucose  111 MG/DL (70 - 105)  H  20  05:56    


 


Calcium  9.7 mg/dL (8.4-10.2)   20  05:39    


 


Total Bilirubin  0.9 mg/dL (0.0-1.0)   20  11:17    


 


AST  33 U/L (10-37)   20  11:17    


 


ALT  25 U/L (12-78)   20  11:17    


 


Alkaline Phosphatase  89 U/L ()   20  11:17    


 


Total Protein  7.6 g/dL (6.4-8.3)   20  11:17    


 


Albumin  3.8 g/dL (3.4-5.0)   20  11:17    


 


Triglycerides  100 mg/dL (<150)   20  12:16    


 


Cholesterol  152 mg/dL (<200)   20  12:16    


 


LDL Cholesterol Direct  90 mg/dL ()   20  12:16    


 


HDL Cholesterol  41 mg/dL (23-92)   20  12:16    


 


Urine Color  YELLOW  (YELLOW)   20  11:00    


 


Urine Clarity  HAZY  (CLEAR)  H  20  11:00    


 


Urine pH  6.0  (5.0-8.0)   20  11:00    


 


Ur Specific Gravity  >=1.030  (1.005-1.030)  H  20  11:00    


 


Urine Protein  1+  (NEGATIVE)  H  20  11:00    


 


Urine Ketones  3+  (NEGATIVE)  H  20  11:00    


 


Urine Blood  TRACE  (NEGATIVE)  H  20  11:00    


 


Urine Nitrate  NEGATIVE  (NEGATIVE)   20  11:00    


 


Urine Bilirubin  1+  (NEGATIVE)  H  20  11:00    


 


Urine Urobilinogen  0.2  (0.2-1.0)   20  11:00    


 


Ur Leukocyte Esterase  NEGATIVE  (NEGATIVE)   20  11:00    


 


Urine Glucose  NEGATIVE  (NEGATIVE)   20  11:00    


 


Coronavirus (PCR)  Negative  (Negative)   20  15:45    














- Physical Exam


Vitals and I&O: 


 Vital Signs











Temp  98.3 F   20 06:24


 


Pulse  88   20 06:24


 


Resp  18   20 07:31


 


BP  102/80   20 06:24


 


Pulse Ox  97   20 06:24








 Intake & Output











 20





 18:59 06:59 18:59


 


Intake Total 800 120 


 


Balance 800 120 


 


Intake:   


 


  Oral 800 120 


 


Other:   


 


  # Voids  3 


 


  Stool Characteristics Soft  











Active Medications: 


Current Medications





Acetaminophen (Tylenol)  650 mg PO Q4H PRN


   PRN Reason: Pain (Mild 1-3)


   Stop: 20 00:26


Al Hydrox/Mg Hydrox/Simethicone (Maalox)  30 ml PO Q4HR PRN


   PRN Reason: GI DISTRESS


   Stop: 20 00:26


Benztropine Mesylate (Cogentin)  0.5 mg PO BID LARRY


   Stop: 20 16:59


   Last Admin: 20 08:26 Dose:  0.5 mg


Benztropine Mesylate (Cogentin)  0.5 mg IM BID PRN


   PRN Reason: EPS (TO GIVE WITH HALDOL IM)


   Stop: 20 14:25


Dextrose (Glutose 40%)  18.75 gm PO PRN PRN


   PRN Reason: BS Below 70 if tolerate po


   Stop: 20 01:51


Glucagon (Glucagen)  1 mg IM PRN PRN


   PRN Reason: BS Below 70 if not tolerate po


   Stop: 20 01:51


Haloperidol Lactate (Haldol)  4 mg PO BID [facility]; Protocol


   Stop: 20 16:59


   Last Admin: 20 08:26 Dose:  4 mg


Haloperidol Lactate (Haldol)  4 mg IM BID PRN


   PRN Reason: Agitation if pt refused po


   Stop: 20 14:18


Insulin Human Lispro (Humalog Insulin Sliding Scale)  0 units SUBQ QDAC [facility]; 

Protocol


   Stop: 20 07:29


   Last Admin: 20 06:47 Dose:  Not Given


Lorazepam (Ativan)  0.5 mg PO Q4HR PRN; Protocol


   PRN Reason: Agitation


   Stop: 20 00:26


   Last Admin: 20 08:26 Dose:  0.5 mg


Magnesium Hydroxide (Milk Of Magnesia)  30 ml PO HS PRN


   PRN Reason: Constipation


Multivitamins/Vitamin C (Theragran)  1 tab PO DAILY LARRY


   Stop: 20 08:59


   Last Admin: 20 08:26 Dose:  1 tab


Quetiapine Fumarate (Seroquel)  100 mg PO HS LARRY


   Stop: 20 20:59


Zolpidem Tartrate (Ambien)  5 mg PO HS PRN


   PRN Reason: Insomnia


   Stop: 20 00:26


   Last Admin: 20 21:20 Dose:  5 mg








General: weak


HEENT: NC/AT, PERRLA


Neck: Supple


Lungs: CTAB


Cardiovascular: RRR, Normal S1, Normal S2


Abdomen: soft, +GT - discharge


Extremities: clear


Neurological: no change





Internal Medicine Assmt/Plan





- Assessment


Assessment: 





1.  DM


2.  HTN





- Plan


Plan: 





continue sliding scale insulin


d/w r.n.


reviewed complete medical records





Nutritional Asmnt/Malnutr-PDOC





- Dietary Evaluation


Malnutrition Findings (Please click <Entered> for more info): 








Nutritional Asmnt/Malnutrition                             Start:  20 10:

33


Text:                                                      Status: Complete    

  


Freq:                                                                          

  


Protocol:                                                                      

  


 Document     20 10:33  MMCESAR  (Rec: 20 10:44  MMCESAR BARLOW-

[id number])


 Nutritional Asmnt/Malnutrition


     Patient General Information


      Nutritional Screening                      Low Risk


      Diagnosis                                  Psychosis


      Pertinent Medical Hx/Surgical Hx           DM, hiatal hernia


      Subjective Information                     Patient was transferred from


                                                 [facility].


                                                 Current diet order providing ~


                                                 2100 kcal, 74 gm protein, with


                                                 current diet intake of 0-25%


                                                 of meals, average intake ~525


                                                 kcal, 18gm protein.


      Current Diet Order/ Nutrition Support      Regular


      Patient / S.O                              Not Indicated


      Pertinent Medications                      maalox, Glucagon, Humalog, MOM


                                                 , Theragran


      Pertinent Labs                             () K 3.1


     Nutritional Hx/Data


      Height                                     1.63 m


      Height (Calculated Centimeters)            162.6


      Current Weight (lbs)                       84.368 kg


      Weight (Calculated Kilograms)              84.4


      Weight (Calculated Grams)                  17238.2


      Ideal Body Weight                          120


      % Ideal Body Weight                        155


      Body Mass Index (BMI)                      31.9


      Recent Weight Change                       No


      Weight Status                              Obese


     GI Symptoms


      GI Symptoms                                None


      Last BM                                    not noted


      Difficult in:                              None


      Food Allergies                             No


      Cultural/Ethnic/Voodoo Belief           not indicated


      Usual diet at home                         unknown


      Skin Integrity/Comment:                    Dryness, Maksim 21


      Current %PO                                Negligible < 25%


     Estimated Nutritional Goals


      BEE in Kcals:                              Adj wt of IBW


      Calories/Kcals/Kg                          62kg adj BW 25-30 kcal/kg


      Kcals Calculated                           ~2172-6806 kcal/day


      Protein:                                   Adj wt of IBW


      Protein g/kgm/kg


      Protein Calculated                         ~60gm/day


      Fluid: ml                                  ~6238-9425 kcal/day


     Nutritional Problem


      1. Problem


       Problem                                   Inadequate oral intake related


                                                 to


       Etiology                                  poor appetite aeb


       Signs/Symptoms:                           PO intake <25% of meals


     Intervention/Recommendation


      Comments                                   1. Continue regular diet as


                                                 tolerated by patient.


                                                 2. Start Ensure Enlive TID for


                                                 added calories and protein.


     Expected Outcomes/Goals


      Expected Outcomes/Goals                    Oral intake >75% of meals,


                                                 weight stable or trend toward


                                                 IBW, nutrition related labs


                                                 WNL


                                                 F/U MR -

## 2020-07-14 RX ADMIN — INSULIN LISPRO SCH: 100 INJECTION, SOLUTION INTRAVENOUS; SUBCUTANEOUS at 06:37

## 2020-07-14 RX ADMIN — HALOPERIDOL LACTATE SCH: 2 SOLUTION, CONCENTRATE ORAL at 08:22

## 2020-07-14 RX ADMIN — HALOPERIDOL LACTATE SCH MG: 2 SOLUTION, CONCENTRATE ORAL at 17:22

## 2020-07-14 NOTE — PROGRESS NOTES
DATE:     07/14/2020



Case was discussed with staff of the patient, reviewed records.  The patient

continues to be delusional.  Though she is taking medications she believes that

we have still have a lot of pedophiles,  The patient continues to look 
disheveled,

disorganized, internally preoccupied.  Continues to have poor insight.  No side

effects with the medication, no sedation, no nausea, no extrapyramidal symptoms.

 I will be increasing her Seroquel to 75 mg twice a day.  The patient continues

to be psychotic, delusional, believes   She asked me to look into

my photographs and i will know ? very delusional and no side effects with the

medication, no sedation, no nausea, no extrapyramidal symptoms.



ASSESSMENT:  The patient is still very psychotic, delusional and paranoid.  We

will continue outpatient group therapy, milieu therapy, and adjust medications

as needed.





DD: 07/14/2020 12:00

DT: 07/14/2020 19:43

JOB# [id number]  [id number]

ISIDRA

## 2020-07-14 NOTE — INTERNAL MEDICINE PROG NOTE
Internal Medicine Subjective





- Subjective


Service Date: 20


Patient is:: awake, interactive, in bed


Per staff patient has:: no adverse event, no episodes of fall (no fevers, no 

cough, no sob)





Internal Medicine Objective





- Results


Result Diagrams: 


 20 11:17





 20 05:39


Recent Labs: 


 Laboratory Last Values











WBC  11.3 K/uL (4.8-10.8)  H  20  11:17    


 


RBC  4.95 MIL/uL (4.2-6.2)   20  11:17    


 


Hgb  15.8 g/dL (12.0-16.0)   20  11:17    


 


Hct  46.3 % (36-48)   20  11:17    


 


MCV  94 fL (79.0-98.0)   20  11:17    


 


MCH  32 pg (27-31)  H  20  11:17    


 


MCHC  34 % (32-36)   20  11:17    


 


RDW  13.3 % (9.0-15.0)   20  11:17    


 


Plt Count  228 K/uL (130-430)   20  11:17    


 


MPV  10.7 fl (7.4-10.4)  H  20  11:17    


 


Neut % (Auto)  75.8 % (40-70)  H  20  11:17    


 


Lymph % (Auto)  15.2 % (20.5-51.5)  L  20  11:17    


 


Mono % (Auto)  7.3 % (1.7-9.3)   20  11:17    


 


Eos % (Auto)  1.2 % (0-4)   20  11:17    


 


Baso % (Auto)  0.5 % (0.0-2.0)   20  11:17    


 


Neut # (Auto)  8.5 K/uL (1.8-7.7)  H  20  11:17    


 


Lymph # (Auto)  1.7 K/uL (1.0-5.5)   20  11:17    


 


Mono # (Auto)  0.8 K/uL (0.0-1.0)   20  11:17    


 


Eos # (Auto)  0.1 K/uL (0.0-0.4)   20  11:17    


 


Baso # (Auto)  0.1 K/uL (0.0-0.2)   20  11:17    


 


Sodium  141 mmol/L (136-145)   20  05:39    


 


Potassium  3.9 mmol/L (3.5-5.1)   20  05:39    


 


Chloride  104 mmol/L ()   20  05:39    


 


Carbon Dioxide  29 mmol/L (23-29)   20  05:39    


 


Anion Gap  12  (5-15)   20  05:39    


 


BUN  33 mg/dL (8-21)  H  20  05:39    


 


Creatinine  1.41 mg/dL (0.70-1.30)  H  20  05:39    


 


Glucose  98 mg/dL (70-99)   20  05:39    


 


POC Glucose  94 MG/DL (70 - 105)   20  06:20    


 


Calcium  9.7 mg/dL (8.4-10.2)   20  05:39    


 


Total Bilirubin  0.9 mg/dL (0.0-1.0)   20  11:17    


 


AST  33 U/L (10-37)   20  11:17    


 


ALT  25 U/L (12-78)   20  11:17    


 


Alkaline Phosphatase  89 U/L ()   20  11:17    


 


Total Protein  7.6 g/dL (6.4-8.3)   20  11:17    


 


Albumin  3.8 g/dL (3.4-5.0)   20  11:17    


 


Triglycerides  100 mg/dL (<150)   20  12:16    


 


Cholesterol  152 mg/dL (<200)   20  12:16    


 


LDL Cholesterol Direct  90 mg/dL ()   20  12:16    


 


HDL Cholesterol  41 mg/dL (23-92)   20  12:16    


 


Urine Color  YELLOW  (YELLOW)   20  11:00    


 


Urine Clarity  HAZY  (CLEAR)  H  20  11:00    


 


Urine pH  6.0  (5.0-8.0)   20  11:00    


 


Ur Specific Gravity  >=1.030  (1.005-1.030)  H  20  11:00    


 


Urine Protein  1+  (NEGATIVE)  H  20  11:00    


 


Urine Ketones  3+  (NEGATIVE)  H  20  11:00    


 


Urine Blood  TRACE  (NEGATIVE)  H  20  11:00    


 


Urine Nitrate  NEGATIVE  (NEGATIVE)   20  11:00    


 


Urine Bilirubin  1+  (NEGATIVE)  H  20  11:00    


 


Urine Urobilinogen  0.2  (0.2-1.0)   20  11:00    


 


Ur Leukocyte Esterase  NEGATIVE  (NEGATIVE)   20  11:00    


 


Urine Glucose  NEGATIVE  (NEGATIVE)   20  11:00    


 


Coronavirus (PCR)  Negative  (Negative)   20  15:45    














- Physical Exam


Vitals and I&O: 


 Vital Signs











Temp  97.6 F   20 06:19


 


Pulse  85   20 06:19


 


Resp  20   20 07:36


 


BP  105/66   20 06:19


 


Pulse Ox  95   20 06:19








 Intake & Output











 20





 18:59 06:59 18:59


 


Intake Total 1200 360 


 


Output Total  1 


 


Balance 1200 359 


 


Intake:   


 


  Oral 840 360 


 


  Other 360  


 


Output:   


 


  Urine/Stool Mix  1 


 


Other:   


 


  # Voids 3 1 


 


  # Bowel Movements 0 0 











Active Medications: 


Current Medications





Acetaminophen (Tylenol)  650 mg PO Q4H PRN


   PRN Reason: Pain (Mild 1-3)


   Stop: 20 00:26


Al Hydrox/Mg Hydrox/Simethicone (Maalox)  30 ml PO Q4HR PRN


   PRN Reason: GI DISTRESS


   Stop: 20 00:26


Benztropine Mesylate (Cogentin)  0.5 mg PO BID LARRY


   Stop: 20 16:59


   Last Admin: 20 08:21 Dose:  0.5 mg


Benztropine Mesylate (Cogentin)  0.5 mg IM BID PRN


   PRN Reason: EPS (TO GIVE WITH HALDOL IM)


   Stop: 20 14:25


Dextrose (Glutose 40%)  18.75 gm PO PRN PRN


   PRN Reason: BS Below 70 if tolerate po


   Stop: 20 01:51


Glucagon (Glucagen)  1 mg IM PRN PRN


   PRN Reason: BS Below 70 if not tolerate po


   Stop: 20 01:51


Haloperidol Lactate (Haldol)  4 mg PO BID [facility]; Protocol


   Stop: 20 16:59


   Last Admin: 20 08:22 Dose:  Not Given


Haloperidol Lactate (Haldol)  4 mg IM BID PRN


   PRN Reason: Agitation if pt refused po


   Stop: 20 14:18


Insulin Human Lispro (Humalog Insulin Sliding Scale)  0 units SUBQ QDAC [facility]; 

Protocol


   Stop: 20 07:29


   Last Admin: 20 06:37 Dose:  Not Given


Lorazepam (Ativan)  0.5 mg PO Q4HR PRN; Protocol


   PRN Reason: Agitation


   Stop: 20 00:26


   Last Admin: 20 08:21 Dose:  0.5 mg


Magnesium Hydroxide (Milk Of Magnesia)  30 ml PO HS PRN


   PRN Reason: Constipation


Multivitamins/Vitamin C (Theragran)  1 tab PO DAILY LARRY


   Stop: 20 08:59


   Last Admin: 20 08:21 Dose:  1 tab


Quetiapine Fumarate (Seroquel)  100 mg PO HS LARRY


   Stop: 20 20:59


   Last Admin: 20 21:40 Dose:  100 mg


Zolpidem Tartrate (Ambien)  5 mg PO HS PRN


   PRN Reason: Insomnia


   Stop: 20 00:26


   Last Admin: 20 21:20 Dose:  5 mg








General: weak


HEENT: NC/AT, PERRLA


Neck: Supple


Lungs: CTAB


Cardiovascular: RRR, Normal S1, Normal S2


Abdomen: soft, +GT - discharge


Extremities: clear


Neurological: no change





Internal Medicine Assmt/Plan





- Assessment


Assessment: 





1.  DM


2.  HTN





- Plan


Plan: 





continue sliding scale insulin


d/w r.n.


reviewed complete medical records





Nutritional Asmnt/Malnutr-PDOC





- Dietary Evaluation


Malnutrition Findings (Please click <Entered> for more info): 








Nutritional Asmnt/Malnutrition                             Start:  20 10:

33


Text:                                                      Status: Complete    

  


Freq:                                                                          

  


Protocol:                                                                      

  


 Document     20 10:33  MMCESAR  (Rec: 20 10:44  CRESCENCIO  CAIN-

CTXTS-02)


 Nutritional Asmnt/Malnutrition


     Patient General Information


      Nutritional Screening                      Low Risk


      Diagnosis                                  Psychosis


      Pertinent Medical Hx/Surgical Hx           DM, hiatal hernia


      Subjective Information                     Patient was transferred from


                                                 [facility].


                                                 Current diet order providing ~


                                                 2100 kcal, 74 gm protein, with


                                                 current diet intake of 0-25%


                                                 of meals, average intake ~525


                                                 kcal, 18gm protein.


      Current Diet Order/ Nutrition Support      Regular


      Patient / S.O                              Not Indicated


      Pertinent Medications                      maalox, Glucagon, Humalog, MOM


                                                 , Theragran


      Pertinent Labs                             () K 3.1


     Nutritional Hx/Data


      Height                                     1.63 m


      Height (Calculated Centimeters)            162.6


      Current Weight (lbs)                       84.368 kg


      Weight (Calculated Kilograms)              84.4


      Weight (Calculated Grams)                  99263.2


      Ideal Body Weight                          120


      % Ideal Body Weight                        155


      Body Mass Index (BMI)                      31.9


      Recent Weight Change                       No


      Weight Status                              Obese


     GI Symptoms


      GI Symptoms                                None


      Last BM                                    not noted


      Difficult in:                              None


      Food Allergies                             No


      Cultural/Ethnic/Taoist Belief           not indicated


      Usual diet at home                         unknown


      Skin Integrity/Comment:                    Dryness, Maksim 21


      Current %PO                                Negligible < 25%


     Estimated Nutritional Goals


      BEE in Kcals:                              Adj wt of IBW


      Calories/Kcals/Kg                          62kg adj BW 25-30 kcal/kg


      Kcals Calculated                           ~8013-1293 kcal/day


      Protein:                                   Adj wt of IBW


      Protein g/kgm/kg


      Protein Calculated                         ~60gm/day


      Fluid: ml                                  ~2796-3073 kcal/day


     Nutritional Problem


      1. Problem


       Problem                                   Inadequate oral intake related


                                                 to


       Etiology                                  poor appetite aeb


       Signs/Symptoms:                           PO intake <25% of meals


     Intervention/Recommendation


      Comments                                   1. Continue regular diet as


                                                 tolerated by patient.


                                                 2. Start Ensure Enlive TID for


                                                 added calories and protein.


     Expected Outcomes/Goals


      Expected Outcomes/Goals                    Oral intake >75% of meals,


                                                 weight stable or trend toward


                                                 IBW, nutrition related labs


                                                 WNL


                                                 F/U MR -

## 2020-07-15 RX ADMIN — INSULIN LISPRO SCH: 100 INJECTION, SOLUTION INTRAVENOUS; SUBCUTANEOUS at 06:35

## 2020-07-15 RX ADMIN — HALOPERIDOL SCH MG: 2 SOLUTION ORAL at 16:54

## 2020-07-15 RX ADMIN — HALOPERIDOL SCH MG: 2 SOLUTION ORAL at 09:36

## 2020-07-15 NOTE — INTERNAL MEDICINE PROG NOTE
Internal Medicine Subjective





- Subjective


Service Date: 07/15/20


Patient seen and examined:: without staff (patient noted to have fevers. no 

cough, no sob, no sore throat)


Patient is:: awake, interactive, in bed


Per staff patient has:: no adverse event, no episodes of fall (no fevers, no 

cough, no sob)





Internal Medicine Objective





- Results


Result Diagrams: 


 20 11:17





 20 05:39


Recent Labs: 


 Laboratory Last Values











WBC  11.3 K/uL (4.8-10.8)  H  20  11:17    


 


RBC  4.95 MIL/uL (4.2-6.2)   20  11:17    


 


Hgb  15.8 g/dL (12.0-16.0)   20  11:17    


 


Hct  46.3 % (36-48)   20  11:17    


 


MCV  94 fL (79.0-98.0)   20  11:17    


 


MCH  32 pg (27-31)  H  20  11:17    


 


MCHC  34 % (32-36)   20  11:17    


 


RDW  13.3 % (9.0-15.0)   20  11:17    


 


Plt Count  228 K/uL (130-430)   20  11:17    


 


MPV  10.7 fl (7.4-10.4)  H  20  11:17    


 


Neut % (Auto)  75.8 % (40-70)  H  20  11:17    


 


Lymph % (Auto)  15.2 % (20.5-51.5)  L  20  11:17    


 


Mono % (Auto)  7.3 % (1.7-9.3)   20  11:17    


 


Eos % (Auto)  1.2 % (0-4)   20  11:17    


 


Baso % (Auto)  0.5 % (0.0-2.0)   20  11:17    


 


Neut # (Auto)  8.5 K/uL (1.8-7.7)  H  20  11:17    


 


Lymph # (Auto)  1.7 K/uL (1.0-5.5)   20  11:17    


 


Mono # (Auto)  0.8 K/uL (0.0-1.0)   20  11:17    


 


Eos # (Auto)  0.1 K/uL (0.0-0.4)   20  11:17    


 


Baso # (Auto)  0.1 K/uL (0.0-0.2)   20  11:17    


 


Sodium  141 mmol/L (136-145)   20  05:39    


 


Potassium  3.9 mmol/L (3.5-5.1)   20  05:39    


 


Chloride  104 mmol/L ()   20  05:39    


 


Carbon Dioxide  29 mmol/L (23-29)   20  05:39    


 


Anion Gap  12  (5-15)   20  05:39    


 


BUN  33 mg/dL (8-21)  H  20  05:39    


 


Creatinine  1.41 mg/dL (0.70-1.30)  H  20  05:39    


 


Glucose  98 mg/dL (70-99)   20  05:39    


 


POC Glucose  98 MG/DL (70 - 105)   07/15/20  06:07    


 


Calcium  9.7 mg/dL (8.4-10.2)   20  05:39    


 


Total Bilirubin  0.9 mg/dL (0.0-1.0)   20  11:17    


 


AST  33 U/L (10-37)   20  11:17    


 


ALT  25 U/L (12-78)   20  11:17    


 


Alkaline Phosphatase  89 U/L ()   20  11:17    


 


Total Protein  7.6 g/dL (6.4-8.3)   20  11:17    


 


Albumin  3.8 g/dL (3.4-5.0)   20  11:17    


 


Triglycerides  100 mg/dL (<150)   20  12:16    


 


Cholesterol  152 mg/dL (<200)   20  12:16    


 


LDL Cholesterol Direct  90 mg/dL ()   20  12:16    


 


HDL Cholesterol  41 mg/dL (23-92)   20  12:16    


 


Urine Color  YELLOW  (YELLOW)   20  11:00    


 


Urine Clarity  HAZY  (CLEAR)  H  20  11:00    


 


Urine pH  6.0  (5.0-8.0)   20  11:00    


 


Ur Specific Gravity  >=1.030  (1.005-1.030)  H  20  11:00    


 


Urine Protein  1+  (NEGATIVE)  H  20  11:00    


 


Urine Ketones  3+  (NEGATIVE)  H  20  11:00    


 


Urine Blood  TRACE  (NEGATIVE)  H  20  11:00    


 


Urine Nitrate  NEGATIVE  (NEGATIVE)   20  11:00    


 


Urine Bilirubin  1+  (NEGATIVE)  H  20  11:00    


 


Urine Urobilinogen  0.2  (0.2-1.0)   20  11:00    


 


Ur Leukocyte Esterase  NEGATIVE  (NEGATIVE)   20  11:00    


 


Urine Glucose  NEGATIVE  (NEGATIVE)   20  11:00    


 


Coronavirus (PCR)  Negative  (Negative)   20  15:45    














- Physical Exam


Vitals and I&O: 


 Vital Signs











Temp  101.3 F   07/15/20 14:00


 


Pulse  103   07/15/20 14:00


 


Resp  18   07/15/20 14:00


 


BP  104/66   07/15/20 14:00


 


Pulse Ox  91   07/15/20 14:00








 Intake & Output











 07/15/20 07/15/20 07/16/20





 06:59 18:59 06:59


 


Intake Total 480 300 


 


Balance 480 300 


 


Intake:   


 


  Oral 480 300 


 


Other:   


 


  # Voids 1 3 


 


  # Bowel Movements  0 











Active Medications: 


Current Medications





Acetaminophen (Tylenol)  650 mg PO Q4H PRN


   PRN Reason: Pain (Mild 1-3)


   Stop: 20 00:26


   Last Admin: 07/15/20 16:54 Dose:  650 mg


Al Hydrox/Mg Hydrox/Simethicone (Maalox)  30 ml PO Q4HR PRN


   PRN Reason: GI DISTRESS


   Stop: 20 00:26


Benztropine Mesylate (Cogentin)  0.5 mg PO BID LARRY


   Stop: 20 16:59


   Last Admin: 07/15/20 16:54 Dose:  0.5 mg


Benztropine Mesylate (Cogentin)  0.5 mg IM BID PRN


   PRN Reason: EPS (TO GIVE WITH HALDOL IM)


   Stop: 20 14:25


Dextrose (Glutose 40%)  18.75 gm PO PRN PRN


   PRN Reason: BS Below 70 if tolerate po


   Stop: 20 01:51


Divalproex Sodium (Depakote Dr)  250 mg PO Q8HR [facility]; Protocol


   Stop: 20 12:59


   Last Admin: 07/15/20 13:37 Dose:  250 mg


Glucagon (Glucagen)  1 mg IM PRN PRN


   PRN Reason: BS Below 70 if not tolerate po


   Stop: 20 01:51


Haloperidol Lactate (Haldol)  6 mg IM BID PRN


   PRN Reason: Agitation if pt refused po


   Stop: 20 14:18


Haloperidol Lactate (Haldol Concentrate 10mg/5ml Susp)  6 mg PO BID [facility]; 

Protocol


   Stop: 20 08:59


   Last Admin: 07/15/20 16:54 Dose:  6 mg


Insulin Human Lispro (Humalog Insulin Sliding Scale)  0 units SUBQ QDAC [facility]; 

Protocol


   Stop: 20 07:29


   Last Admin: 07/15/20 06:35 Dose:  Not Given


Lorazepam (Ativan)  0.5 mg PO Q4HR PRN; Protocol


   PRN Reason: Agitation


   Stop: 20 00:26


   Last Admin: 20 08:21 Dose:  0.5 mg


Magnesium Hydroxide (Milk Of Magnesia)  30 ml PO HS PRN


   PRN Reason: Constipation


Multivitamins/Vitamin C (Theragran)  1 tab PO DAILY LARRY


   Stop: 20 08:59


   Last Admin: 07/15/20 09:37 Dose:  1 tab


Quetiapine Fumarate (Seroquel)  100 mg PO BID [facility]


   Stop: 20 08:59


   Last Admin: 07/15/20 16:54 Dose:  100 mg


Zolpidem Tartrate (Ambien)  5 mg PO HS PRN


   PRN Reason: Insomnia


   Stop: 20 00:26


   Last Admin: 20 21:20 Dose:  5 mg








General: weak


HEENT: NC/AT, PERRLA


Neck: Supple


Lungs: CTAB


Cardiovascular: RRR, Normal S1, Normal S2


Abdomen: soft, +GT - discharge


Extremities: clear


Neurological: no change





Internal Medicine Assmt/Plan





- Assessment


Assessment: 





1. Fever


2. DM/HTN





- Plan


Plan: 


check covid-19 PCR


check cbc, cmp


check cxr


nursing staff instructed to isolate patient 


continue sliding scale insulin


d/w r.n.


reviewed complete medical records





Nutritional Asmnt/Malnutr-PDOC





- Dietary Evaluation


Malnutrition Findings (Please click <Entered> for more info): 








Nutritional Asmnt/Malnutrition                             Start:  20 10:

33


Text:                                                      Status: Complete    

  


Freq:                                                                          

  


Protocol:                                                                      

  


 Document     20 10:33  MMCESAR  (Rec: 20 10:44  MMULHERN  CAIN-

[id number])


 Nutritional Asmnt/Malnutrition


     Patient General Information


      Nutritional Screening                      Low Risk


      Diagnosis                                  Psychosis


      Pertinent Medical Hx/Surgical Hx           DM, hiatal hernia


      Subjective Information                     Patient was transferred from


                                                 [facility].


                                                 Current diet order providing ~


                                                 2100 kcal, 74 gm protein, with


                                                 current diet intake of 0-25%


                                                 of meals, average intake ~525


                                                 kcal, 18gm protein.


      Current Diet Order/ Nutrition Support      Regular


      Patient / S.O                              Not Indicated


      Pertinent Medications                      maalox, Glucagon, Humalog, MOM


                                                 , Theragran


      Pertinent Labs                             () K 3.1


     Nutritional Hx/Data


      Height                                     1.63 m


      Height (Calculated Centimeters)            162.6


      Current Weight (lbs)                       84.368 kg


      Weight (Calculated Kilograms)              84.4


      Weight (Calculated Grams)                  29782.2


      Ideal Body Weight                          120


      % Ideal Body Weight                        155


      Body Mass Index (BMI)                      31.9


      Recent Weight Change                       No


      Weight Status                              Obese


     GI Symptoms


      GI Symptoms                                None


      Last BM                                    not noted


      Difficult in:                              None


      Food Allergies                             No


      Cultural/Ethnic/Religion Belief           not indicated


      Usual diet at home                         unknown


      Skin Integrity/Comment:                    Dryness, Maksim 21


      Current %PO                                Negligible < 25%


     Estimated Nutritional Goals


      BEE in Kcals:                              Adj wt of IBW


      Calories/Kcals/Kg                          62kg adj BW 25-30 kcal/kg


      Kcals Calculated                           ~4081-4201 kcal/day


      Protein:                                   Adj wt of IBW


      Protein g/kgm/kg


      Protein Calculated                         ~60gm/day


      Fluid: ml                                  ~7873-7369 kcal/day


     Nutritional Problem


      1. Problem


       Problem                                   Inadequate oral intake related


                                                 to


       Etiology                                  poor appetite aeb


       Signs/Symptoms:                           PO intake <25% of meals


     Intervention/Recommendation


      Comments                                   1. Continue regular diet as


                                                 tolerated by patient.


                                                 2. Start Ensure Enlive TID for


                                                 added calories and protein.


     Expected Outcomes/Goals


      Expected Outcomes/Goals                    Oral intake >75% of meals,


                                                 weight stable or trend toward


                                                 IBW, nutrition related labs


                                                 WNL


                                                 F/U MR -

## 2020-07-15 NOTE — PROGRESS NOTES
DATE:  07/15/2020



Case was discussed with staff of the patient and reviewed records.  The patient

today is still psychotic, asked me to get out of there.  She continues to be

delusional, continues to have poor insight, continues to isolate herself, and

very irritable.  Her affect is angry.  Her mood is depressed.  Thoughts are

concrete.  She believes that we are all pedophiles here.  I will be increasing

her Seroquel to 100 mg twice a day as well as her Haldol to 6 mg twice a day. 

She is [place], meaning she can get injections if she refuses oral medications by

court order.  Increasing Seroquel to 100 mg twice a day.  No side effects with

the medication, no sedation, no nausea.  Also introducing Depakote, discussed

side effects.  We will continue to work with the patient in group therapy,

milieu therapy, and adjust medications as needed.





DD: 07/15/2020 08:11

DT: 07/15/2020 09:16

JOB# [id number]  [id number]

## 2020-07-16 LAB
BASOPHILS # BLD AUTO: 0 K/UL (ref 0–0.2)
BASOPHILS NFR BLD AUTO: 0.5 % (ref 0–2)
EOSINOPHIL # BLD AUTO: 0 K/UL (ref 0–0.4)
EOSINOPHIL NFR BLD AUTO: 1.2 % (ref 0–4)
ERYTHROCYTE [DISTWIDTH] IN BLOOD BY AUTOMATED COUNT: 13.1 % (ref 9–15)
HCT VFR BLD CALC: 40.8 % (ref 36–48)
HGB BLD-MCNC: 13.7 G/DL (ref 12–16)
LYMPHOCYTE AB SER FC-ACNC: 1.2 K/UL (ref 1–5.5)
LYMPHOCYTES NFR BLD AUTO: 31.2 % (ref 20.5–51.5)
MCH RBC QN AUTO: 32 PG (ref 27–31)
MCHC RBC AUTO-ENTMCNC: 34 % (ref 32–36)
MCV RBC AUTO: 94 FL (ref 79–98)
MONOCYTES # BLD AUTO: 0.7 K/UL (ref 0–1)
MONOCYTES NFR BLD AUTO: 17.9 % (ref 1.7–9.3)
NEUTROPHILS # BLD: 1.9 K/UL (ref 1.8–7.7)
NEUTROPHILS NFR BLD AUTO: 49.2 % (ref 40–70)
PLATELET # BLD: 97 K/UL (ref 130–430)
RBC # BLD AUTO: 4.32 MIL/UL (ref 4.2–6.2)
WBC # BLD AUTO: 3.8 K/UL (ref 4.8–10.8)

## 2020-07-16 RX ADMIN — HALOPERIDOL SCH MG: 5 TABLET ORAL at 16:31

## 2020-07-16 RX ADMIN — INSULIN LISPRO SCH: 100 INJECTION, SOLUTION INTRAVENOUS; SUBCUTANEOUS at 06:38

## 2020-07-16 RX ADMIN — HALOPERIDOL SCH MG: 2 SOLUTION ORAL at 08:24

## 2020-07-16 NOTE — INTERNAL MEDICINE PROG NOTE
Internal Medicine Subjective





- Subjective


Service Date: 20


Patient seen and examined:: without staff


Patient is:: asleep, non-interactive (no fevers no cough no sob), in bed


Per staff patient has:: no adverse event, no episodes of fall (no fevers, no 

cough, no sob)





Internal Medicine Objective





- Results


Result Diagrams: 


 20 10:05





 20 05:39


Recent Labs: 


 Laboratory Last Values











WBC  3.8 K/uL (4.8-10.8)  L  20  10:05    


 


RBC  4.32 MIL/uL (4.2-6.2)   20  10:05    


 


Hgb  13.7 g/dL (12.0-16.0)   20  10:05    


 


Hct  40.8 % (36-48)   20  10:05    


 


MCV  94 fL (79.0-98.0)   20  10:05    


 


MCH  32 pg (27-31)  H  20  10:05    


 


MCHC  34 % (32-36)   20  10:05    


 


RDW  13.1 % (9.0-15.0)   20  10:05    


 


Plt Count  97 K/uL (130-430)  L  20  10:05    


 


MPV  11.6 fl (7.4-10.4)  H  20  10:05    


 


Neut % (Auto)  49.2 % (40-70)   20  10:05    


 


Lymph % (Auto)  31.2 % (20.5-51.5)   20  10:05    


 


Mono % (Auto)  17.9 % (1.7-9.3)  H  20  10:05    


 


Eos % (Auto)  1.2 % (0-4)   20  10:05    


 


Baso % (Auto)  0.5 % (0.0-2.0)   20  10:05    


 


Neut # (Auto)  1.9 K/uL (1.8-7.7)   20  10:05    


 


Lymph # (Auto)  1.2 K/uL (1.0-5.5)   20  10:05    


 


Mono # (Auto)  0.7 K/uL (0.0-1.0)   20  10:05    


 


Eos # (Auto)  0.0 K/uL (0.0-0.4)   20  10:05    


 


Baso # (Auto)  0.0 K/uL (0.0-0.2)   20  10:05    


 


Sodium  141 mmol/L (136-145)   20  05:39    


 


Potassium  3.9 mmol/L (3.5-5.1)   20  05:39    


 


Chloride  104 mmol/L ()   20  05:39    


 


Carbon Dioxide  29 mmol/L (23-29)   20  05:39    


 


Anion Gap  12  (5-15)   20  05:39    


 


BUN  33 mg/dL (8-21)  H  20  05:39    


 


Creatinine  1.41 mg/dL (0.70-1.30)  H  20  05:39    


 


Glucose  98 mg/dL (70-99)   20  05:39    


 


POC Glucose  107 MG/DL (70 - 105)  H  20  06:11    


 


Calcium  9.7 mg/dL (8.4-10.2)   20  05:39    


 


Total Bilirubin  0.9 mg/dL (0.0-1.0)   20  11:17    


 


AST  33 U/L (10-37)   20  11:17    


 


ALT  25 U/L (12-78)   20  11:17    


 


Alkaline Phosphatase  89 U/L ()   20  11:17    


 


Total Protein  7.6 g/dL (6.4-8.3)   20  11:17    


 


Albumin  3.8 g/dL (3.4-5.0)   20  11:17    


 


Triglycerides  100 mg/dL (<150)   20  12:16    


 


Cholesterol  152 mg/dL (<200)   20  12:16    


 


LDL Cholesterol Direct  90 mg/dL ()   20  12:16    


 


HDL Cholesterol  41 mg/dL (23-92)   20  12:16    


 


Urine Color  YELLOW  (YELLOW)   20  11:00    


 


Urine Clarity  HAZY  (CLEAR)  H  20  11:00    


 


Urine pH  6.0  (5.0-8.0)   20  11:00    


 


Ur Specific Gravity  >=1.030  (1.005-1.030)  H  20  11:00    


 


Urine Protein  1+  (NEGATIVE)  H  20  11:00    


 


Urine Ketones  3+  (NEGATIVE)  H  20  11:00    


 


Urine Blood  TRACE  (NEGATIVE)  H  20  11:00    


 


Urine Nitrate  NEGATIVE  (NEGATIVE)   20  11:00    


 


Urine Bilirubin  1+  (NEGATIVE)  H  20  11:00    


 


Urine Urobilinogen  0.2  (0.2-1.0)   20  11:00    


 


Ur Leukocyte Esterase  NEGATIVE  (NEGATIVE)   20  11:00    


 


Urine Glucose  NEGATIVE  (NEGATIVE)   20  11:00    


 


Coronavirus (PCR)  Negative  (Negative)   20  15:45    














- Physical Exam


Vitals and I&O: 


 Vital Signs











Temp  97.6 F   20 06:02


 


Pulse  93   20 06:02


 


Resp  18   20 08:00


 


BP  115/76   20 06:02


 


Pulse Ox  95   20 06:02








 Intake & Output











 07/15/20 07/16/20 07/16/20





 18:59 06:59 18:59


 


Intake Total 300 120 


 


Balance 300 120 


 


Intake:   


 


  Oral 300 120 


 


Other:   


 


  # Voids 3 2 


 


  # Bowel Movements 0 0 











Active Medications: 


Current Medications





Acetaminophen (Tylenol)  650 mg PO Q4H PRN


   PRN Reason: Pain (Mild 1-3)


   Stop: 20 00:26


   Last Admin: 07/15/20 16:54 Dose:  650 mg


Al Hydrox/Mg Hydrox/Simethicone (Maalox)  30 ml PO Q4HR PRN


   PRN Reason: GI DISTRESS


   Stop: 20 00:26


Benztropine Mesylate (Cogentin)  0.5 mg PO BID LARRY


   Stop: 20 16:59


   Last Admin: 20 08:25 Dose:  0.5 mg


Benztropine Mesylate (Cogentin)  0.5 mg IM BID PRN


   PRN Reason: EPS (TO GIVE WITH HALDOL IM)


   Stop: 20 14:25


Dextrose (Glutose 40%)  18.75 gm PO PRN PRN


   PRN Reason: BS Below 70 if tolerate po


   Stop: 20 01:51


Divalproex Sodium (Depakote Dr)  250 mg PO Q8HR [facility]; Protocol


   Stop: 20 12:59


   Last Admin: 20 12:40 Dose:  250 mg


Glucagon (Glucagen)  1 mg IM PRN PRN


   PRN Reason: BS Below 70 if not tolerate po


   Stop: 20 01:51


Haloperidol 5 mg/ Haloperidol (1 mg)  6 mg PO BID [facility]


   Stop: 20 16:59


Haloperidol Lactate (Haldol)  6 mg IM BID PRN


   PRN Reason: Agitation if pt refused po


   Stop: 20 14:18


Insulin Human Lispro (Humalog Insulin Sliding Scale)  0 units SUBQ QDAC [facility]; 

Protocol


   Stop: 20 07:29


   Last Admin: 20 06:38 Dose:  Not Given


Lorazepam (Ativan)  0.5 mg PO Q4HR PRN; Protocol


   PRN Reason: Agitation


   Stop: 20 00:26


   Last Admin: 20 08:21 Dose:  0.5 mg


Magnesium Hydroxide (Milk Of Magnesia)  30 ml PO HS PRN


   PRN Reason: Constipation


Multivitamins/Vitamin C (Theragran)  1 tab PO DAILY [facility]


   Stop: 20 08:59


   Last Admin: 20 08:25 Dose:  1 tab


Quetiapine Fumarate 100 mg/ (Quetiapine Fumarate 50 mg)  150 mg PO BID [facility]


   Stop: 20 16:59


Zolpidem Tartrate (Ambien)  5 mg PO HS PRN


   PRN Reason: Insomnia


   Stop: 20 00:26


   Last Admin: 20 21:20 Dose:  5 mg








General: weak


HEENT: NC/AT, PERRLA


Neck: Supple


Lungs: CTAB


Cardiovascular: RRR, Normal S1, Normal S2


Abdomen: soft, +GT - discharge


Extremities: clear


Neurological: no change





Internal Medicine Assmt/Plan





- Assessment


Assessment: 





1. Leukopenia


2. Fever


3. DM/HtN





- Plan


Plan: 


await covid test result


check cxr


start levaquin 500mg po daily


nursing staff instructed to isolate patient 


continue sliding scale insulin


d/w r.n.


reviewed complete medical records





Nutritional Asmnt/Malnutr-PDOC





- Dietary Evaluation


Malnutrition Findings (Please click <Entered> for more info): 








Nutritional Asmnt/Malnutrition                             Start:  20 10:

33


Text:                                                      Status: Complete    

  


Freq:                                                                          

  


Protocol:                                                                      

  


 Document     20 10:33  CRESCENCIO  (Rec: 20 10:44  CRESCENCIO  CAIN-

CTXTS-02)


 Nutritional Asmnt/Malnutrition


     Patient General Information


      Nutritional Screening                      Low Risk


      Diagnosis                                  Psychosis


      Pertinent Medical Hx/Surgical Hx           DM, hiatal hernia


      Subjective Information                     Patient was transferred from


                                                 [facility].


                                                 Current diet order providing ~


                                                 2100 kcal, 74 gm protein, with


                                                 current diet intake of 0-25%


                                                 of meals, average intake ~525


                                                 kcal, 18gm protein.


      Current Diet Order/ Nutrition Support      Regular


      Patient / S.O                              Not Indicated


      Pertinent Medications                      maalox, Glucagon, Humalog, MOM


                                                 , Theragran


      Pertinent Labs                             () K 3.1


     Nutritional Hx/Data


      Height                                     1.63 m


      Height (Calculated Centimeters)            162.6


      Current Weight (lbs)                       84.368 kg


      Weight (Calculated Kilograms)              84.4


      Weight (Calculated Grams)                  40857.2


      Ideal Body Weight                          120


      % Ideal Body Weight                        155


      Body Mass Index (BMI)                      31.9


      Recent Weight Change                       No


      Weight Status                              Obese


     GI Symptoms


      GI Symptoms                                None


      Last BM                                    not noted


      Difficult in:                              None


      Food Allergies                             No


      Cultural/Ethnic/Mandaeism Belief           not indicated


      Usual diet at home                         unknown


      Skin Integrity/Comment:                    Maksim Ramirez 21


      Current %PO                                Negligible < 25%


     Estimated Nutritional Goals


      BEE in Kcals:                              Adj wt of IBW


      Calories/Kcals/Kg                          62kg adj BW 25-30 kcal/kg


      Kcals Calculated                           ~6682-3784 kcal/day


      Protein:                                   Adj wt of IBW


      Protein g/kgm/kg


      Protein Calculated                         ~60gm/day


      Fluid: ml                                  ~3374-8566 kcal/day


     Nutritional Problem


      1. Problem


       Problem                                   Inadequate oral intake related


                                                 to


       Etiology                                  poor appetite aeb


       Signs/Symptoms:                           PO intake <25% of meals


     Intervention/Recommendation


      Comments                                   1. Continue regular diet as


                                                 tolerated by patient.


                                                 2. Start Ensure Enlive TID for


                                                 added calories and protein.


     Expected Outcomes/Goals


      Expected Outcomes/Goals                    Oral intake >75% of meals,


                                                 weight stable or trend toward


                                                 IBW, nutrition related labs


                                                 WNL


                                                 F/U MR -

## 2020-07-16 NOTE — PROGRESS NOTES
DATE:  07/16/2020



FOLLOWUP PROGRESS NOTE



Case was discussed with staff of the patient, reviewed records.  The patient

continues to be psychotic, internally preoccupied.  The staff report that she

does take her medication.  She is being briefed.  No side effects with the

medication, no sedation, no nausea, no extrapyramidal symptoms.  Continues to be

paranoid, delusional, not willing to talk, mumbling to herself.  I am increasing

Seroquel 250 mg twice a day.  No side effects with the medication, no sedation,

no nausea and no extrapyramidal symptoms.  We will continue to work with the

patient in group therapy, milieu therapy and adjust the medication as needed.





DD: 07/16/2020 15:42

DT: 07/16/2020 20:57

JOB# [id number]  [id number]

## 2020-07-17 RX ADMIN — HALOPERIDOL SCH MG: 5 TABLET ORAL at 16:18

## 2020-07-17 RX ADMIN — INSULIN LISPRO SCH: 100 INJECTION, SOLUTION INTRAVENOUS; SUBCUTANEOUS at 06:35

## 2020-07-17 RX ADMIN — HALOPERIDOL SCH MG: 5 TABLET ORAL at 08:36

## 2020-07-17 NOTE — INTERNAL MEDICINE PROG NOTE
Internal Medicine Subjective





- Subjective


Service Date: 20


Patient seen and examined:: without staff (somnolent.  denies any fevers, no 

cough, no sob)


Patient is:: asleep, non-interactive (no fevers no cough no sob), in bed


Per staff patient has:: no adverse event, no episodes of fall (no fevers, no 

cough, no sob)





Internal Medicine Objective





- Results


Result Diagrams: 


 20 10:05





 20 05:39


Recent Labs: 


 Laboratory Last Values











WBC  3.8 K/uL (4.8-10.8)  L  20  10:05    


 


RBC  4.32 MIL/uL (4.2-6.2)   20  10:05    


 


Hgb  13.7 g/dL (12.0-16.0)   20  10:05    


 


Hct  40.8 % (36-48)   20  10:05    


 


MCV  94 fL (79.0-98.0)   20  10:05    


 


MCH  32 pg (27-31)  H  20  10:05    


 


MCHC  34 % (32-36)   20  10:05    


 


RDW  13.1 % (9.0-15.0)   20  10:05    


 


Plt Count  97 K/uL (130-430)  L  20  10:05    


 


MPV  11.6 fl (7.4-10.4)  H  20  10:05    


 


Neut % (Auto)  49.2 % (40-70)   20  10:05    


 


Lymph % (Auto)  31.2 % (20.5-51.5)   20  10:05    


 


Mono % (Auto)  17.9 % (1.7-9.3)  H  20  10:05    


 


Eos % (Auto)  1.2 % (0-4)   20  10:05    


 


Baso % (Auto)  0.5 % (0.0-2.0)   20  10:05    


 


Neut # (Auto)  1.9 K/uL (1.8-7.7)   20  10:05    


 


Lymph # (Auto)  1.2 K/uL (1.0-5.5)   20  10:05    


 


Mono # (Auto)  0.7 K/uL (0.0-1.0)   20  10:05    


 


Eos # (Auto)  0.0 K/uL (0.0-0.4)   20  10:05    


 


Baso # (Auto)  0.0 K/uL (0.0-0.2)   20  10:05    


 


Sodium  141 mmol/L (136-145)   20  05:39    


 


Potassium  3.9 mmol/L (3.5-5.1)   20  05:39    


 


Chloride  104 mmol/L ()   20  05:39    


 


Carbon Dioxide  29 mmol/L (23-29)   20  05:39    


 


Anion Gap  12  (5-15)   20  05:39    


 


BUN  33 mg/dL (8-21)  H  20  05:39    


 


Creatinine  1.41 mg/dL (0.70-1.30)  H  20  05:39    


 


Glucose  98 mg/dL (70-99)   20  05:39    


 


POC Glucose  95 MG/DL (70 - 105)   20  06:16    


 


Calcium  9.7 mg/dL (8.4-10.2)   20  05:39    


 


Total Bilirubin  0.9 mg/dL (0.0-1.0)   20  11:17    


 


AST  33 U/L (10-37)   20  11:17    


 


ALT  25 U/L (12-78)   20  11:17    


 


Alkaline Phosphatase  89 U/L ()   20  11:17    


 


Total Protein  7.6 g/dL (6.4-8.3)   20  11:17    


 


Albumin  3.8 g/dL (3.4-5.0)   20  11:17    


 


Triglycerides  100 mg/dL (<150)   20  12:16    


 


Cholesterol  152 mg/dL (<200)   20  12:16    


 


LDL Cholesterol Direct  90 mg/dL ()   20  12:16    


 


HDL Cholesterol  41 mg/dL (23-92)   20  12:16    


 


Urine Color  YELLOW  (YELLOW)   20  11:00    


 


Urine Clarity  HAZY  (CLEAR)  H  20  11:00    


 


Urine pH  6.0  (5.0-8.0)   20  11:00    


 


Ur Specific Gravity  >=1.030  (1.005-1.030)  H  20  11:00    


 


Urine Protein  1+  (NEGATIVE)  H  20  11:00    


 


Urine Ketones  3+  (NEGATIVE)  H  20  11:00    


 


Urine Blood  TRACE  (NEGATIVE)  H  20  11:00    


 


Urine Nitrate  NEGATIVE  (NEGATIVE)   20  11:00    


 


Urine Bilirubin  1+  (NEGATIVE)  H  20  11:00    


 


Urine Urobilinogen  0.2  (0.2-1.0)   20  11:00    


 


Ur Leukocyte Esterase  NEGATIVE  (NEGATIVE)   20  11:00    


 


Urine Glucose  NEGATIVE  (NEGATIVE)   20  11:00    


 


Coronavirus (PCR)  Negative  (Negative)   20  15:45    














- Physical Exam


Vitals and I&O: 


 Vital Signs











Temp  98.0 F   20 05:59


 


Pulse  74   20 05:59


 


Resp  20   20 05:59


 


BP  99/66   20 05:59


 


Pulse Ox  95   20 05:59








 Intake & Output











 20





 18:59 06:59 18:59


 


Intake Total 1000  


 


Balance 1000  


 


Intake:   


 


  Oral 1000  


 


Other:   


 


  # Voids 4  


 


  # Bowel Movements 1  











Active Medications: 


Current Medications





Acetaminophen (Tylenol)  650 mg PO Q4H PRN


   PRN Reason: Pain (Mild 1-3)


   Stop: 20 00:26


   Last Admin: 07/15/20 16:54 Dose:  650 mg


Al Hydrox/Mg Hydrox/Simethicone (Maalox)  30 ml PO Q4HR PRN


   PRN Reason: GI DISTRESS


   Stop: 20 00:26


Benztropine Mesylate (Cogentin)  0.5 mg PO BID LARRY


   Stop: 20 16:59


   Last Admin: 20 08:36 Dose:  0.5 mg


Benztropine Mesylate (Cogentin)  0.5 mg IM BID PRN


   PRN Reason: EPS (TO GIVE WITH HALDOL IM)


   Stop: 20 14:25


Dextrose (Glutose 40%)  18.75 gm PO PRN PRN


   PRN Reason: BS Below 70 if tolerate po


   Stop: 20 01:51


Divalproex Sodium (Depakote Dr)  250 mg PO Q8HR [facility]; Protocol


   Stop: 20 12:59


   Last Admin: 20 05:09 Dose:  250 mg


Glucagon (Glucagen)  1 mg IM PRN PRN


   PRN Reason: BS Below 70 if not tolerate po


   Stop: 20 01:51


Haloperidol 5 mg/ Haloperidol (1 mg)  6 mg PO BID [facility]


   Stop: 20 16:59


   Last Admin: 20 08:36 Dose:  6 mg


Haloperidol Lactate (Haldol)  6 mg IM BID PRN


   PRN Reason: Agitation if pt refused po


   Stop: 20 14:18


Insulin Human Lispro (Humalog Insulin Sliding Scale)  0 units SUBQ QDAC [facility]; 

Protocol


   Stop: 20 07:29


   Last Admin: 20 06:35 Dose:  Not Given


Levofloxacin (Levaquin)  500 mg PO DAILY [facility]


   Stop: 20 14:59


   Last Admin: 20 08:36 Dose:  500 mg


Lorazepam (Ativan)  0.5 mg PO Q4HR PRN; Protocol


   PRN Reason: Agitation


   Stop: 20 00:26


   Last Admin: 20 08:21 Dose:  0.5 mg


Magnesium Hydroxide (Milk Of Magnesia)  30 ml PO HS PRN


   PRN Reason: Constipation


Multivitamins/Vitamin C (Theragran)  1 tab PO DAILY LARRY


   Stop: 20 08:59


   Last Admin: 20 08:36 Dose:  1 tab


Quetiapine Fumarate (Seroquel)  200 mg PO BID [facility]


   Stop: 09/15/20 16:59


Zolpidem Tartrate (Ambien)  5 mg PO HS PRN


   PRN Reason: Insomnia


   Stop: 20 00:26


   Last Admin: 20 21:20 Dose:  5 mg








General: weak


HEENT: NC/AT, PERRLA


Neck: Supple


Lungs: CTAB


Cardiovascular: RRR, Normal S1, Normal S2


Abdomen: soft, +GT - discharge


Extremities: clear


Neurological: no change





Internal Medicine Assmt/Plan





- Assessment


Assessment: 





1. Fever, resolved


2. DM/HTN





- Plan


Plan: 


await covid test result


continue levaquin 500 mg po daily


nursing staff instructed to isolate patient 


continue sliding scale insulin


d/w r.n.


reviewed complete medical records





Nutritional Asmnt/Malnutr-PDOC





- Dietary Evaluation


Malnutrition Findings (Please click <Entered> for more info): 








Nutritional Asmnt/Malnutrition                             Start:  20 10:

33


Text:                                                      Status: Complete    

  


Freq:                                                                          

  


Protocol:                                                                      

  


 Document     20 10:33  [facility]MCES[facility]  (Rec: 20 10:44  MMULHERN  CAIN-

[id number])


 Nutritional Asmnt/Malnutrition


     Patient General Information


      Nutritional Screening                      Low Risk


      Diagnosis                                  Psychosis


      Pertinent Medical Hx/Surgical Hx           DM, hiatal hernia


      Subjective Information                     Patient was transferred from


                                                 [facility].


                                                 Current diet order providing ~


                                                 2100 kcal, 74 gm protein, with


                                                 current diet intake of 0-25%


                                                 of meals, average intake ~525


                                                 kcal, 18gm protein.


      Current Diet Order/ Nutrition Support      Regular


      Patient / S.O                              Not Indicated


      Pertinent Medications                      maalox, Glucagon, Humalog, MOM


                                                 , Theragran


      Pertinent Labs                             () K 3.1


     Nutritional Hx/Data


      Height                                     1.63 m


      Height (Calculated Centimeters)            162.6


      Current Weight (lbs)                       84.368 kg


      Weight (Calculated Kilograms)              84.4


      Weight (Calculated Grams)                  38011.2


      Ideal Body Weight                          120


      % Ideal Body Weight                        155


      Body Mass Index (BMI)                      31.9


      Recent Weight Change                       No


      Weight Status                              Obese


     GI Symptoms


      GI Symptoms                                None


      Last BM                                    not noted


      Difficult in:                              None


      Food Allergies                             No


      Cultural/Ethnic/Islam Belief           not indicated


      Usual diet at home                         unknown


      Skin Integrity/Comment:                    Dryness, Maksim 21


      Current %PO                                Negligible < 25%


     Estimated Nutritional Goals


      BEE in Kcals:                              Adj wt of IBW


      Calories/Kcals/Kg                          62kg adj BW 25-30 kcal/kg


      Kcals Calculated                           ~1993-5040 kcal/day


      Protein:                                   Adj wt of IBW


      Protein g/kgm/kg


      Protein Calculated                         ~60gm/day


      Fluid: ml                                  ~3167-4515 kcal/day


     Nutritional Problem


      1. Problem


       Problem                                   Inadequate oral intake related


                                                 to


       Etiology                                  poor appetite aeb


       Signs/Symptoms:                           PO intake <25% of meals


     Intervention/Recommendation


      Comments                                   1. Continue regular diet as


                                                 tolerated by patient.


                                                 2. Start Ensure Enlive TID for


                                                 added calories and protein.


     Expected Outcomes/Goals


      Expected Outcomes/Goals                    Oral intake >75% of meals,


                                                 weight stable or trend toward


                                                 IBW, nutrition related labs


                                                 WNL


                                                 F/U MR -

## 2020-07-17 NOTE — PROGRESS NOTES
DATE:  07/17/2020



Case was discussed with staff of the patient, reviewed records.  The patient is

a little bit less angry, less irritable.  However, continues to isolate herself.

 She continues to refuse to allow us to talk to her family.  Continues to be

somewhat delusional.  In general, she is showing progress.  She is less

agitated, responding better to redirection.  She has been compliant with the

medication with no side effects, no sedation, no nausea, no extrapyramidal

symptoms.  I will be increasing her Seroquel to 200 mg twice a day to help

improve her psychotic symptoms, delusional behavior, paranoia.  We will continue

outpatient group therapy, milieu therapy, adjust medication as needed.





DD: 07/17/2020 12:37

DT: 07/17/2020 14:19

JOB# [id number]  [id number]

## 2020-07-18 ENCOUNTER — HOSPITAL ENCOUNTER (INPATIENT)
Dept: HOSPITAL 4 - SED | Age: 65
LOS: 16 days | Discharge: LEFT BEFORE DISCHARGE | DRG: 177 | End: 2020-08-03
Attending: INTERNAL MEDICINE | Admitting: INTERNAL MEDICINE
Payer: COMMERCIAL

## 2020-07-18 VITALS — WEIGHT: 176 LBS | BODY MASS INDEX: 29.32 KG/M2 | HEIGHT: 65 IN

## 2020-07-18 VITALS — SYSTOLIC BLOOD PRESSURE: 90 MMHG

## 2020-07-18 DIAGNOSIS — E43: ICD-10-CM

## 2020-07-18 DIAGNOSIS — F41.9: ICD-10-CM

## 2020-07-18 DIAGNOSIS — N39.0: ICD-10-CM

## 2020-07-18 DIAGNOSIS — Z53.29: ICD-10-CM

## 2020-07-18 DIAGNOSIS — N17.0: ICD-10-CM

## 2020-07-18 DIAGNOSIS — E11.9: ICD-10-CM

## 2020-07-18 DIAGNOSIS — F12.10: ICD-10-CM

## 2020-07-18 DIAGNOSIS — F25.9: ICD-10-CM

## 2020-07-18 DIAGNOSIS — F32.9: ICD-10-CM

## 2020-07-18 DIAGNOSIS — J12.89: ICD-10-CM

## 2020-07-18 DIAGNOSIS — F13.10: ICD-10-CM

## 2020-07-18 DIAGNOSIS — U07.1: Primary | ICD-10-CM

## 2020-07-18 DIAGNOSIS — F14.10: ICD-10-CM

## 2020-07-18 LAB
ALBUMIN SERPL BCP-MCNC: 2.9 G/DL (ref 3.4–4.8)
ALT SERPL W P-5'-P-CCNC: 57 U/L (ref 12–78)
ANION GAP SERPL CALCULATED.3IONS-SCNC: 5 MMOL/L (ref 5–15)
APPEARANCE UR: (no result)
AST SERPL W P-5'-P-CCNC: 45 U/L (ref 10–37)
BASOPHILS # BLD AUTO: 0 K/UL (ref 0–0.2)
BASOPHILS NFR BLD AUTO: 1 % (ref 0–2)
BILIRUB SERPL-MCNC: 0.5 MG/DL (ref 0–1)
BILIRUB UR QL STRIP: NEGATIVE
BUN SERPL-MCNC: 35 MG/DL (ref 8–21)
CALCIUM SERPL-MCNC: 8.9 MG/DL (ref 8.4–11)
CHLORIDE SERPL-SCNC: 100 MMOL/L (ref 98–107)
COLOR UR: YELLOW
CREAT SERPL-MCNC: 1.64 MG/DL (ref 0.55–1.3)
EOSINOPHIL # BLD AUTO: 0.1 K/UL (ref 0–0.4)
EOSINOPHIL NFR BLD AUTO: 1.6 % (ref 0–4)
ERYTHROCYTE [DISTWIDTH] IN BLOOD BY AUTOMATED COUNT: 13.2 % (ref 9–15)
GFR SERPL CREATININE-BSD FRML MDRD: 40 ML/MIN (ref 90–?)
GLUCOSE SERPL-MCNC: 90 MG/DL (ref 70–99)
GLUCOSE UR STRIP-MCNC: NEGATIVE MG/DL
HCT VFR BLD AUTO: 40.5 % (ref 36–48)
HGB BLD-MCNC: 13.6 G/DL (ref 12–16)
HGB UR QL STRIP: NEGATIVE
INR PPP: 1.1 (ref 0.8–1.2)
KETONES UR STRIP-MCNC: (no result) MG/DL
LEUKOCYTE ESTERASE UR QL STRIP: (no result)
LYMPHOCYTES # BLD AUTO: 1.2 K/UL (ref 1–5.5)
LYMPHOCYTES NFR BLD AUTO: 35 % (ref 20.5–51.5)
MCH RBC QN AUTO: 32 PG (ref 27–31)
MCHC RBC AUTO-ENTMCNC: 34 % (ref 32–36)
MCV RBC AUTO: 95 FL (ref 79–98)
MONOCYTES # BLD MANUAL: 0.4 K/UL (ref 0–1)
MONOCYTES # BLD MANUAL: 11.7 % (ref 1.7–9.3)
NEUTROPHILS # BLD AUTO: 1.8 K/UL (ref 1.8–7.7)
NEUTROPHILS NFR BLD AUTO: 50.7 % (ref 40–70)
NITRITE UR QL STRIP: NEGATIVE
PH UR STRIP: 5.5 [PH] (ref 5–8)
PLATELET # BLD AUTO: 94 K/UL (ref 130–430)
POTASSIUM SERPL-SCNC: 3.9 MMOL/L (ref 3.5–5.1)
PROT UR QL STRIP: (no result)
PROTHROMBIN TIME: 10.6 SECS (ref 9.5–12.5)
RBC # BLD AUTO: 4.25 MIL/UL (ref 4.2–6.2)
SODIUM SERPLBLD-SCNC: 137 MMOL/L (ref 136–145)
SP GR UR STRIP: >=1.03 (ref 1–1.03)
UROBILINOGEN UR STRIP-MCNC: 0.2 MG/DL (ref 0.2–1)
WBC # BLD AUTO: 3.5 K/UL (ref 4.8–10.8)

## 2020-07-18 RX ADMIN — INSULIN LISPRO SCH: 100 INJECTION, SOLUTION INTRAVENOUS; SUBCUTANEOUS at 06:37

## 2020-07-18 RX ADMIN — HALOPERIDOL SCH: 5 TABLET ORAL at 17:20

## 2020-07-18 RX ADMIN — HALOPERIDOL SCH: 5 TABLET ORAL at 08:14

## 2020-07-18 RX ADMIN — HALOPERIDOL SCH MG: 5 TABLET ORAL at 10:02

## 2020-07-18 NOTE — PROGRESS NOTES
DATE:  07/18/2020



SUBJECTIVE:  A 64-year-old female with history of schizophrenia, brought to the

ER, found sleeping in someone's garage, brought back for management for

aggressive behaviors.  The patient noting that she made some sort of report to

the FBI when I go to see her.  Unfortunately, she does not want to engage with

me.  Discussed with staff, chart review.  The patient apparently still

psychotic, delusional, poor insight, still talking about the FBI, believing that

the staffs are pedophilic, ongoing concerns about pedophilia.  The patient had

to be ____, so she is getting her medications against her will if she refuses.



ASSESSMENT:  A 64-year-old female with ongoing delusions, behavioral

disturbances.  I tried to engage with her, unfortunately, she is still

delusional and rejects talking to me for the most part.



PLAN:  We will continue to monitor ongoing symptoms, psychotic symptoms.





DD: 07/18/2020 06:44

DT: 07/18/2020 07:46

JOB# [id number]  [id number]

## 2020-07-19 VITALS — SYSTOLIC BLOOD PRESSURE: 86 MMHG

## 2020-07-19 VITALS — SYSTOLIC BLOOD PRESSURE: 66 MMHG

## 2020-07-19 VITALS — SYSTOLIC BLOOD PRESSURE: 106 MMHG

## 2020-07-19 VITALS — SYSTOLIC BLOOD PRESSURE: 88 MMHG

## 2020-07-19 LAB
ALBUMIN SERPL BCP-MCNC: 2.6 G/DL (ref 3.4–4.8)
ALT SERPL W P-5'-P-CCNC: 57 U/L (ref 12–78)
AMPHETAMINES UR QL SCN: NEGATIVE
ANION GAP SERPL CALCULATED.3IONS-SCNC: 5 MMOL/L (ref 5–15)
AST SERPL W P-5'-P-CCNC: 42 U/L (ref 10–37)
BACTERIA URNS QL MICRO: (no result) /HPF
BARBITURATES UR QL SCN: NEGATIVE
BASOPHILS # BLD AUTO: 0 K/UL (ref 0–0.2)
BASOPHILS NFR BLD AUTO: 0.2 % (ref 0–2)
BENZODIAZ UR QL SCN: POSITIVE
BILIRUB SERPL-MCNC: 0.4 MG/DL (ref 0–1)
BUN SERPL-MCNC: 28 MG/DL (ref 8–21)
BZE UR QL SCN: POSITIVE
CALCIUM SERPL-MCNC: 7.9 MG/DL (ref 8.4–11)
CANNABINOIDS UR QL SCN: POSITIVE
CHLORIDE SERPL-SCNC: 100 MMOL/L (ref 98–107)
CREAT SERPL-MCNC: 1.37 MG/DL (ref 0.55–1.3)
CRP SERPL-MCNC: 0.8 MG/DL (ref 0–0.5)
EOSINOPHIL # BLD AUTO: 0 K/UL (ref 0–0.4)
EOSINOPHIL NFR BLD AUTO: 1.4 % (ref 0–4)
ERYTHROCYTE [DISTWIDTH] IN BLOOD BY AUTOMATED COUNT: 12.8 % (ref 9–15)
GFR SERPL CREATININE-BSD FRML MDRD: 50 ML/MIN (ref 90–?)
GLUCOSE SERPL-MCNC: 89 MG/DL (ref 70–99)
HCT VFR BLD AUTO: 39.4 % (ref 36–48)
HGB BLD-MCNC: 13.3 G/DL (ref 12–16)
HYALINE CASTS URNS QL MICRO: (no result) /LPF
INR PPP: 1.1 (ref 0.8–1.2)
LYMPHOCYTES # BLD AUTO: 1.3 K/UL (ref 1–5.5)
LYMPHOCYTES NFR BLD AUTO: 40.4 % (ref 20.5–51.5)
MCH RBC QN AUTO: 32 PG (ref 27–31)
MCHC RBC AUTO-ENTMCNC: 34 % (ref 32–36)
MCV RBC AUTO: 94 FL (ref 79–98)
METHADONE UR-SCNC: NEGATIVE UMOL/L
METHAMPHET UR-SCNC: NEGATIVE UMOL/L
MONOCYTES # BLD MANUAL: 0.3 K/UL (ref 0–1)
MONOCYTES # BLD MANUAL: 10.3 % (ref 1.7–9.3)
NEUTROPHILS # BLD AUTO: 1.5 K/UL (ref 1.8–7.7)
NEUTROPHILS NFR BLD AUTO: 47.7 % (ref 40–70)
OPIATES UR QL SCN: NEGATIVE
OXYCODONE SERPL-MCNC: NEGATIVE NG/ML
PCP UR QL SCN: NEGATIVE
PLATELET # BLD AUTO: 89 K/UL (ref 130–430)
POTASSIUM SERPL-SCNC: 3.2 MMOL/L (ref 3.5–5.1)
PROTHROMBIN TIME: 10.6 SECS (ref 9.5–12.5)
RBC # BLD AUTO: 4.19 MIL/UL (ref 4.2–6.2)
RBC #/AREA URNS HPF: (no result) /HPF (ref 0–3)
SODIUM SERPLBLD-SCNC: 136 MMOL/L (ref 136–145)
TRICYCLICS UR-MCNC: POSITIVE NG/ML
URINE PROPOXYPHENE SCREEN: NEGATIVE
WBC # BLD AUTO: 3.1 K/UL (ref 4.8–10.8)

## 2020-07-19 RX ADMIN — Medication SCH EA: at 06:37

## 2020-07-19 RX ADMIN — Medication SCH MG: at 21:00

## 2020-07-19 RX ADMIN — DEXTROSE MONOHYDRATE SCH MLS/HR: 50 INJECTION, SOLUTION INTRAVENOUS at 02:00

## 2020-07-19 RX ADMIN — Medication SCH UNIT: at 21:00

## 2020-07-19 RX ADMIN — ENOXAPARIN SODIUM SCH MG: 40 INJECTION SUBCUTANEOUS at 09:00

## 2020-07-19 RX ADMIN — Medication SCH EA: at 21:35

## 2020-07-19 RX ADMIN — Medication SCH EA: at 18:00

## 2020-07-19 RX ADMIN — OXYCODONE HYDROCHLORIDE AND ACETAMINOPHEN SCH MG: 500 TABLET ORAL at 21:00

## 2020-07-19 RX ADMIN — OXYCODONE HYDROCHLORIDE AND ACETAMINOPHEN SCH MG: 500 TABLET ORAL at 09:30

## 2020-07-19 RX ADMIN — Medication SCH MG: at 09:30

## 2020-07-19 RX ADMIN — Medication SCH EA: at 12:47

## 2020-07-19 RX ADMIN — Medication SCH UNIT: at 09:30

## 2020-07-19 RX ADMIN — SODIUM CHLORIDE, SODIUM LACTATE, POTASSIUM CHLORIDE, AND CALCIUM CHLORIDE SCH MLS/HR: 600; 310; 30; 20 INJECTION, SOLUTION INTRAVENOUS at 16:00

## 2020-07-20 VITALS — SYSTOLIC BLOOD PRESSURE: 109 MMHG

## 2020-07-20 VITALS — SYSTOLIC BLOOD PRESSURE: 100 MMHG

## 2020-07-20 VITALS — SYSTOLIC BLOOD PRESSURE: 112 MMHG

## 2020-07-20 VITALS — SYSTOLIC BLOOD PRESSURE: 102 MMHG

## 2020-07-20 VITALS — SYSTOLIC BLOOD PRESSURE: 110 MMHG

## 2020-07-20 LAB
ANION GAP SERPL CALCULATED.3IONS-SCNC: 7 MMOL/L (ref 5–15)
BASOPHILS # BLD AUTO: 0 K/UL (ref 0–0.2)
BASOPHILS NFR BLD AUTO: 0.2 % (ref 0–2)
BUN SERPL-MCNC: 17 MG/DL (ref 8–21)
CALCIUM SERPL-MCNC: 8.1 MG/DL (ref 8.4–11)
CHLORIDE SERPL-SCNC: 105 MMOL/L (ref 98–107)
CREAT SERPL-MCNC: 0.99 MG/DL (ref 0.55–1.3)
EOSINOPHIL # BLD AUTO: 0.1 K/UL (ref 0–0.4)
EOSINOPHIL NFR BLD AUTO: 2.7 % (ref 0–4)
ERYTHROCYTE [DISTWIDTH] IN BLOOD BY AUTOMATED COUNT: 12.9 % (ref 9–15)
GFR SERPL CREATININE-BSD FRML MDRD: 72 ML/MIN (ref 90–?)
GLUCOSE SERPL-MCNC: 88 MG/DL (ref 70–99)
HCT VFR BLD AUTO: 38 % (ref 36–48)
HGB BLD-MCNC: 13 G/DL (ref 12–16)
LYMPHOCYTES # BLD AUTO: 0.8 K/UL (ref 1–5.5)
LYMPHOCYTES NFR BLD AUTO: 27.3 % (ref 20.5–51.5)
MCH RBC QN AUTO: 32 PG (ref 27–31)
MCHC RBC AUTO-ENTMCNC: 34 % (ref 32–36)
MCV RBC AUTO: 94 FL (ref 79–98)
MONOCYTES # BLD MANUAL: 0.3 K/UL (ref 0–1)
MONOCYTES # BLD MANUAL: 11.7 % (ref 1.7–9.3)
NEUTROPHILS # BLD AUTO: 1.6 K/UL (ref 1.8–7.7)
NEUTROPHILS NFR BLD AUTO: 58.1 % (ref 40–70)
PLATELET # BLD AUTO: 79 K/UL (ref 130–430)
POTASSIUM SERPL-SCNC: 3.9 MMOL/L (ref 3.5–5.1)
RBC # BLD AUTO: 4.05 MIL/UL (ref 4.2–6.2)
SODIUM SERPLBLD-SCNC: 139 MMOL/L (ref 136–145)
WBC # BLD AUTO: 2.8 K/UL (ref 4.8–10.8)

## 2020-07-20 RX ADMIN — Medication SCH MG: at 09:00

## 2020-07-20 RX ADMIN — DEXTROSE MONOHYDRATE SCH MLS/HR: 50 INJECTION, SOLUTION INTRAVENOUS at 00:33

## 2020-07-20 RX ADMIN — Medication SCH EA: at 06:34

## 2020-07-20 RX ADMIN — SODIUM CHLORIDE, SODIUM LACTATE, POTASSIUM CHLORIDE, AND CALCIUM CHLORIDE SCH MLS/HR: 600; 310; 30; 20 INJECTION, SOLUTION INTRAVENOUS at 20:00

## 2020-07-20 RX ADMIN — DEXTROSE MONOHYDRATE SCH MLS/HR: 50 INJECTION, SOLUTION INTRAVENOUS at 00:00

## 2020-07-20 RX ADMIN — Medication SCH EA: at 17:00

## 2020-07-20 RX ADMIN — Medication SCH EA: at 12:36

## 2020-07-20 RX ADMIN — Medication SCH EA: at 21:00

## 2020-07-20 RX ADMIN — DEXAMETHASONE SCH MG: 4 TABLET ORAL at 14:15

## 2020-07-20 RX ADMIN — CEFTRIAXONE SODIUM SCH MLS/HR: 1 INJECTION, SOLUTION INTRAVENOUS at 15:00

## 2020-07-20 RX ADMIN — OXYCODONE HYDROCHLORIDE AND ACETAMINOPHEN SCH MG: 500 TABLET ORAL at 09:00

## 2020-07-20 RX ADMIN — OXYCODONE HYDROCHLORIDE AND ACETAMINOPHEN SCH MG: 500 TABLET ORAL at 21:00

## 2020-07-20 RX ADMIN — Medication SCH UNIT: at 21:00

## 2020-07-20 RX ADMIN — Medication SCH MG: at 21:00

## 2020-07-20 RX ADMIN — ENOXAPARIN SODIUM SCH MG: 40 INJECTION SUBCUTANEOUS at 09:00

## 2020-07-20 RX ADMIN — SODIUM CHLORIDE, SODIUM LACTATE, POTASSIUM CHLORIDE, AND CALCIUM CHLORIDE SCH MLS/HR: 600; 310; 30; 20 INJECTION, SOLUTION INTRAVENOUS at 10:00

## 2020-07-20 RX ADMIN — SODIUM CHLORIDE, SODIUM LACTATE, POTASSIUM CHLORIDE, AND CALCIUM CHLORIDE SCH MLS/HR: 600; 310; 30; 20 INJECTION, SOLUTION INTRAVENOUS at 00:33

## 2020-07-20 RX ADMIN — Medication SCH UNIT: at 09:00

## 2020-07-20 RX ADMIN — SODIUM CHLORIDE, SODIUM LACTATE, POTASSIUM CHLORIDE, AND CALCIUM CHLORIDE SCH MLS/HR: 600; 310; 30; 20 INJECTION, SOLUTION INTRAVENOUS at 00:00

## 2020-07-20 NOTE — DISCHARGE SUMMARY
DATE OF DISCHARGE:  07/18/2020



IDENTIFYING INFORMATION:  The patient is a 64-year-old female.



HISTORY OF PRESENT ILLNESS:  The patient with a history of schizophrenia.  The

patient was brought from the Emergency Room after medically clear at [facility].  The patient was not staying at home.  She was found sleeping

in someone's garage, brought back for management of her aggressive behavior. 

The patient reports that COVID ____ I have reported this to the FBI and they are

underway.  The patient was psychotic, delusional, refused to allow us to talk to

her family.



COURSE IN THE HOSPITAL:  The patient was diagnosed with schizoaffective

disorder.  The patient was started on medication and the patient was refusing to

take her medication.  The patient was on Seroquel and had to release her and it

was upheld and I started the patient on Haldol and the dose was increased over

the course of her stay _____ to 200 mg twice a day.  Also, continue with the

Haldol IM to take oral if she does refuse to take it IM.  The patient, however,

started taking medication she will take anything IM, however, the patient

developed COVID-19 and was transferred to [facility]Surg unit.



CONDITION ON DISCHARGE:  The patient continues to be delusional, paranoid,

though she was showing some progress.  She was taking her medication.  The

patient is unable to take care of her ADLs because of her delusions and not

socializing because of paranoia, delusions.



FINAL DIAGNOSIS:  Schizoaffective disorder, bipolar type.



MEDICAL DIAGNOSIS:  COVID-19 and as per medical doctor.



_____ The patient will follow up upon discharge with psychiatrist and primary

care physician and therapist.



EXPECTED OUTCOME:  Hopefully, stable, if the patient complies to above.





DD: 07/20/2020 12:09

DT: 07/20/2020 13:19

JOB# [id number]  [id number]

## 2020-07-21 VITALS — SYSTOLIC BLOOD PRESSURE: 118 MMHG

## 2020-07-21 VITALS — SYSTOLIC BLOOD PRESSURE: 116 MMHG

## 2020-07-21 VITALS — SYSTOLIC BLOOD PRESSURE: 121 MMHG

## 2020-07-21 VITALS — SYSTOLIC BLOOD PRESSURE: 123 MMHG

## 2020-07-21 VITALS — SYSTOLIC BLOOD PRESSURE: 106 MMHG

## 2020-07-21 LAB
CRP SERPL-MCNC: 1 MG/DL (ref 0–0.5)
LACTATE SERPL-SCNC: 147 U/L (ref 81–234)

## 2020-07-21 RX ADMIN — SODIUM CHLORIDE, SODIUM LACTATE, POTASSIUM CHLORIDE, AND CALCIUM CHLORIDE SCH MLS/HR: 600; 310; 30; 20 INJECTION, SOLUTION INTRAVENOUS at 17:17

## 2020-07-21 RX ADMIN — Medication SCH EA: at 07:00

## 2020-07-21 RX ADMIN — DEXAMETHASONE SCH MG: 4 TABLET ORAL at 13:24

## 2020-07-21 RX ADMIN — ENOXAPARIN SODIUM SCH MG: 40 INJECTION SUBCUTANEOUS at 09:00

## 2020-07-21 RX ADMIN — Medication SCH UNIT: at 21:00

## 2020-07-21 RX ADMIN — OXYCODONE HYDROCHLORIDE AND ACETAMINOPHEN SCH MG: 500 TABLET ORAL at 09:00

## 2020-07-21 RX ADMIN — Medication SCH EA: at 10:50

## 2020-07-21 RX ADMIN — Medication SCH EA: at 17:17

## 2020-07-21 RX ADMIN — Medication SCH MG: at 21:00

## 2020-07-21 RX ADMIN — Medication SCH EA: at 21:00

## 2020-07-21 RX ADMIN — Medication SCH UNIT: at 09:00

## 2020-07-21 RX ADMIN — CEFTRIAXONE SODIUM SCH MLS/HR: 1 INJECTION, SOLUTION INTRAVENOUS at 16:00

## 2020-07-21 RX ADMIN — SODIUM CHLORIDE, SODIUM LACTATE, POTASSIUM CHLORIDE, AND CALCIUM CHLORIDE SCH MLS/HR: 600; 310; 30; 20 INJECTION, SOLUTION INTRAVENOUS at 06:00

## 2020-07-21 RX ADMIN — OXYCODONE HYDROCHLORIDE AND ACETAMINOPHEN SCH MG: 500 TABLET ORAL at 21:00

## 2020-07-21 RX ADMIN — Medication SCH MG: at 09:00

## 2020-07-22 VITALS — SYSTOLIC BLOOD PRESSURE: 123 MMHG

## 2020-07-22 VITALS — SYSTOLIC BLOOD PRESSURE: 124 MMHG

## 2020-07-22 VITALS — SYSTOLIC BLOOD PRESSURE: 108 MMHG

## 2020-07-22 VITALS — SYSTOLIC BLOOD PRESSURE: 117 MMHG

## 2020-07-22 LAB
APPEARANCE UR: CLEAR
BILIRUB UR QL STRIP: NEGATIVE
COLOR UR: YELLOW
GLUCOSE UR STRIP-MCNC: NEGATIVE MG/DL
HGB UR QL STRIP: NEGATIVE
KETONES UR STRIP-MCNC: (no result) MG/DL
LEUKOCYTE ESTERASE UR QL STRIP: NEGATIVE
NITRITE UR QL STRIP: NEGATIVE
PH UR STRIP: 7.5 [PH] (ref 5–8)
PROT UR QL STRIP: NEGATIVE
SP GR UR STRIP: 1.01 (ref 1–1.03)
UROBILINOGEN UR STRIP-MCNC: 0.2 MG/DL (ref 0.2–1)

## 2020-07-22 RX ADMIN — Medication SCH MG: at 20:58

## 2020-07-22 RX ADMIN — APIXABAN SCH MG: 2.5 TABLET, FILM COATED ORAL at 21:03

## 2020-07-22 RX ADMIN — ENOXAPARIN SODIUM SCH MG: 40 INJECTION SUBCUTANEOUS at 09:00

## 2020-07-22 RX ADMIN — SODIUM CHLORIDE, SODIUM LACTATE, POTASSIUM CHLORIDE, AND CALCIUM CHLORIDE SCH MLS/HR: 600; 310; 30; 20 INJECTION, SOLUTION INTRAVENOUS at 03:20

## 2020-07-22 RX ADMIN — Medication SCH UNIT: at 20:58

## 2020-07-22 RX ADMIN — Medication SCH MG: at 09:09

## 2020-07-22 RX ADMIN — Medication SCH UNIT: at 09:09

## 2020-07-22 RX ADMIN — OXYCODONE HYDROCHLORIDE AND ACETAMINOPHEN SCH MG: 500 TABLET ORAL at 20:58

## 2020-07-22 RX ADMIN — DEXAMETHASONE SCH MG: 4 TABLET ORAL at 14:30

## 2020-07-22 RX ADMIN — Medication SCH EA: at 18:03

## 2020-07-22 RX ADMIN — CEFTRIAXONE SODIUM SCH MLS/HR: 1 INJECTION, SOLUTION INTRAVENOUS at 14:30

## 2020-07-22 RX ADMIN — Medication SCH EA: at 07:22

## 2020-07-22 RX ADMIN — Medication SCH EA: at 20:59

## 2020-07-22 RX ADMIN — SODIUM CHLORIDE, SODIUM LACTATE, POTASSIUM CHLORIDE, AND CALCIUM CHLORIDE SCH MLS/HR: 600; 310; 30; 20 INJECTION, SOLUTION INTRAVENOUS at 13:22

## 2020-07-22 RX ADMIN — SODIUM CHLORIDE, SODIUM LACTATE, POTASSIUM CHLORIDE, AND CALCIUM CHLORIDE SCH MLS/HR: 600; 310; 30; 20 INJECTION, SOLUTION INTRAVENOUS at 20:59

## 2020-07-22 RX ADMIN — OXYCODONE HYDROCHLORIDE AND ACETAMINOPHEN SCH MG: 500 TABLET ORAL at 09:09

## 2020-07-22 RX ADMIN — Medication SCH EA: at 12:30

## 2020-07-23 VITALS — SYSTOLIC BLOOD PRESSURE: 141 MMHG

## 2020-07-23 VITALS — SYSTOLIC BLOOD PRESSURE: 128 MMHG

## 2020-07-23 VITALS — SYSTOLIC BLOOD PRESSURE: 122 MMHG

## 2020-07-23 VITALS — SYSTOLIC BLOOD PRESSURE: 142 MMHG

## 2020-07-23 VITALS — SYSTOLIC BLOOD PRESSURE: 149 MMHG

## 2020-07-23 LAB
ANION GAP SERPL CALCULATED.3IONS-SCNC: 9 MMOL/L (ref 5–15)
BASOPHILS # BLD AUTO: 0 K/UL (ref 0–0.2)
BASOPHILS NFR BLD AUTO: 0.1 % (ref 0–2)
BUN SERPL-MCNC: 7 MG/DL (ref 8–21)
CALCIUM SERPL-MCNC: 8.1 MG/DL (ref 8.4–11)
CHLORIDE SERPL-SCNC: 105 MMOL/L (ref 98–107)
CREAT SERPL-MCNC: 0.85 MG/DL (ref 0.55–1.3)
CRP SERPL-MCNC: 0.4 MG/DL (ref 0–0.5)
EOSINOPHIL # BLD AUTO: 0 K/UL (ref 0–0.4)
EOSINOPHIL NFR BLD AUTO: 0.1 % (ref 0–4)
ERYTHROCYTE [DISTWIDTH] IN BLOOD BY AUTOMATED COUNT: 12.8 % (ref 9–15)
GFR SERPL CREATININE-BSD FRML MDRD: 86 ML/MIN (ref 90–?)
GLUCOSE SERPL-MCNC: 102 MG/DL (ref 70–99)
HCT VFR BLD AUTO: 36.3 % (ref 36–48)
HGB BLD-MCNC: 12.7 G/DL (ref 12–16)
LYMPHOCYTES # BLD AUTO: 0.7 K/UL (ref 1–5.5)
LYMPHOCYTES NFR BLD AUTO: 13.7 % (ref 20.5–51.5)
MCH RBC QN AUTO: 32 PG (ref 27–31)
MCHC RBC AUTO-ENTMCNC: 35 % (ref 32–36)
MCV RBC AUTO: 93 FL (ref 79–98)
MONOCYTES # BLD MANUAL: 0.4 K/UL (ref 0–1)
MONOCYTES # BLD MANUAL: 7.6 % (ref 1.7–9.3)
NEUTROPHILS # BLD AUTO: 4 K/UL (ref 1.8–7.7)
NEUTROPHILS NFR BLD AUTO: 78.5 % (ref 40–70)
PLATELET # BLD AUTO: 97 K/UL (ref 130–430)
POTASSIUM SERPL-SCNC: 3.3 MMOL/L (ref 3.5–5.1)
RBC # BLD AUTO: 3.92 MIL/UL (ref 4.2–6.2)
SODIUM SERPLBLD-SCNC: 140 MMOL/L (ref 136–145)
WBC # BLD AUTO: 5.1 K/UL (ref 4.8–10.8)

## 2020-07-23 RX ADMIN — Medication SCH MG: at 09:03

## 2020-07-23 RX ADMIN — OXYCODONE HYDROCHLORIDE AND ACETAMINOPHEN SCH MG: 500 TABLET ORAL at 09:03

## 2020-07-23 RX ADMIN — Medication SCH UNIT: at 20:27

## 2020-07-23 RX ADMIN — Medication SCH EA: at 11:33

## 2020-07-23 RX ADMIN — Medication SCH EA: at 07:45

## 2020-07-23 RX ADMIN — DEXAMETHASONE SCH MG: 4 TABLET ORAL at 14:15

## 2020-07-23 RX ADMIN — APIXABAN SCH MG: 2.5 TABLET, FILM COATED ORAL at 09:03

## 2020-07-23 RX ADMIN — Medication SCH UNIT: at 09:03

## 2020-07-23 RX ADMIN — DEXAMETHASONE SCH MG: 4 TABLET ORAL at 14:29

## 2020-07-23 RX ADMIN — OXYCODONE HYDROCHLORIDE AND ACETAMINOPHEN SCH MG: 500 TABLET ORAL at 20:27

## 2020-07-23 RX ADMIN — Medication SCH EA: at 20:27

## 2020-07-23 RX ADMIN — ONDANSETRON PRN MG: 4 TABLET, ORALLY DISINTEGRATING ORAL at 20:27

## 2020-07-23 RX ADMIN — Medication SCH EA: at 17:56

## 2020-07-23 RX ADMIN — Medication SCH MG: at 20:27

## 2020-07-23 RX ADMIN — CEFTRIAXONE SODIUM SCH MLS/HR: 1 INJECTION, SOLUTION INTRAVENOUS at 14:30

## 2020-07-23 RX ADMIN — SODIUM CHLORIDE, SODIUM LACTATE, POTASSIUM CHLORIDE, AND CALCIUM CHLORIDE SCH MLS/HR: 600; 310; 30; 20 INJECTION, SOLUTION INTRAVENOUS at 14:48

## 2020-07-23 RX ADMIN — APIXABAN SCH MG: 2.5 TABLET, FILM COATED ORAL at 21:00

## 2020-07-23 RX ADMIN — SODIUM CHLORIDE, SODIUM LACTATE, POTASSIUM CHLORIDE, AND CALCIUM CHLORIDE SCH MLS/HR: 600; 310; 30; 20 INJECTION, SOLUTION INTRAVENOUS at 09:03

## 2020-07-23 RX ADMIN — ONDANSETRON PRN MG: 4 TABLET, ORALLY DISINTEGRATING ORAL at 11:09

## 2020-07-24 VITALS — SYSTOLIC BLOOD PRESSURE: 112 MMHG

## 2020-07-24 VITALS — SYSTOLIC BLOOD PRESSURE: 125 MMHG

## 2020-07-24 VITALS — SYSTOLIC BLOOD PRESSURE: 110 MMHG

## 2020-07-24 RX ADMIN — OXYCODONE HYDROCHLORIDE AND ACETAMINOPHEN SCH MG: 500 TABLET ORAL at 20:05

## 2020-07-24 RX ADMIN — Medication SCH EA: at 12:31

## 2020-07-24 RX ADMIN — CEFTRIAXONE SODIUM SCH MLS/HR: 1 INJECTION, SOLUTION INTRAVENOUS at 14:39

## 2020-07-24 RX ADMIN — SODIUM CHLORIDE, SODIUM LACTATE, POTASSIUM CHLORIDE, AND CALCIUM CHLORIDE SCH MLS/HR: 600; 310; 30; 20 INJECTION, SOLUTION INTRAVENOUS at 14:05

## 2020-07-24 RX ADMIN — APIXABAN SCH MG: 2.5 TABLET, FILM COATED ORAL at 09:16

## 2020-07-24 RX ADMIN — Medication SCH MG: at 09:12

## 2020-07-24 RX ADMIN — DEXAMETHASONE SCH MG: 4 TABLET ORAL at 14:39

## 2020-07-24 RX ADMIN — Medication SCH UNIT: at 09:13

## 2020-07-24 RX ADMIN — Medication SCH MG: at 20:05

## 2020-07-24 RX ADMIN — Medication SCH EA: at 20:05

## 2020-07-24 RX ADMIN — Medication SCH EA: at 07:00

## 2020-07-24 RX ADMIN — Medication SCH UNIT: at 20:05

## 2020-07-24 RX ADMIN — APIXABAN SCH MG: 2.5 TABLET, FILM COATED ORAL at 20:04

## 2020-07-24 RX ADMIN — OXYCODONE HYDROCHLORIDE AND ACETAMINOPHEN SCH MG: 500 TABLET ORAL at 09:13

## 2020-07-24 RX ADMIN — Medication SCH EA: at 17:00

## 2020-07-24 RX ADMIN — SODIUM CHLORIDE, SODIUM LACTATE, POTASSIUM CHLORIDE, AND CALCIUM CHLORIDE SCH MLS/HR: 600; 310; 30; 20 INJECTION, SOLUTION INTRAVENOUS at 02:36

## 2020-07-24 RX ADMIN — ONDANSETRON PRN MG: 4 TABLET, ORALLY DISINTEGRATING ORAL at 20:05

## 2020-07-25 VITALS — SYSTOLIC BLOOD PRESSURE: 104 MMHG

## 2020-07-25 VITALS — SYSTOLIC BLOOD PRESSURE: 118 MMHG

## 2020-07-25 VITALS — SYSTOLIC BLOOD PRESSURE: 113 MMHG

## 2020-07-25 VITALS — SYSTOLIC BLOOD PRESSURE: 116 MMHG

## 2020-07-25 VITALS — SYSTOLIC BLOOD PRESSURE: 107 MMHG

## 2020-07-25 RX ADMIN — Medication SCH EA: at 12:58

## 2020-07-25 RX ADMIN — APIXABAN SCH MG: 2.5 TABLET, FILM COATED ORAL at 21:00

## 2020-07-25 RX ADMIN — Medication SCH EA: at 18:13

## 2020-07-25 RX ADMIN — Medication SCH MG: at 10:06

## 2020-07-25 RX ADMIN — SODIUM CHLORIDE, SODIUM LACTATE, POTASSIUM CHLORIDE, AND CALCIUM CHLORIDE SCH MLS/HR: 600; 310; 30; 20 INJECTION, SOLUTION INTRAVENOUS at 12:00

## 2020-07-25 RX ADMIN — DEXAMETHASONE SCH MG: 4 TABLET ORAL at 14:15

## 2020-07-25 RX ADMIN — Medication SCH UNIT: at 21:00

## 2020-07-25 RX ADMIN — OXYCODONE HYDROCHLORIDE AND ACETAMINOPHEN SCH MG: 500 TABLET ORAL at 21:00

## 2020-07-25 RX ADMIN — CEFTRIAXONE SODIUM SCH MLS/HR: 1 INJECTION, SOLUTION INTRAVENOUS at 15:49

## 2020-07-25 RX ADMIN — ONDANSETRON PRN MG: 4 TABLET, ORALLY DISINTEGRATING ORAL at 21:00

## 2020-07-25 RX ADMIN — Medication SCH MG: at 21:00

## 2020-07-25 RX ADMIN — SODIUM CHLORIDE, SODIUM LACTATE, POTASSIUM CHLORIDE, AND CALCIUM CHLORIDE SCH MLS/HR: 600; 310; 30; 20 INJECTION, SOLUTION INTRAVENOUS at 20:00

## 2020-07-25 RX ADMIN — OXYCODONE HYDROCHLORIDE AND ACETAMINOPHEN SCH MG: 500 TABLET ORAL at 10:06

## 2020-07-25 RX ADMIN — Medication SCH EA: at 06:44

## 2020-07-25 RX ADMIN — Medication SCH UNIT: at 10:06

## 2020-07-25 RX ADMIN — APIXABAN SCH MG: 2.5 TABLET, FILM COATED ORAL at 10:06

## 2020-07-25 RX ADMIN — Medication SCH EA: at 21:00

## 2020-07-25 RX ADMIN — SODIUM CHLORIDE, SODIUM LACTATE, POTASSIUM CHLORIDE, AND CALCIUM CHLORIDE SCH MLS/HR: 600; 310; 30; 20 INJECTION, SOLUTION INTRAVENOUS at 00:00

## 2020-07-26 VITALS — SYSTOLIC BLOOD PRESSURE: 117 MMHG

## 2020-07-26 VITALS — SYSTOLIC BLOOD PRESSURE: 127 MMHG

## 2020-07-26 VITALS — SYSTOLIC BLOOD PRESSURE: 112 MMHG

## 2020-07-26 VITALS — SYSTOLIC BLOOD PRESSURE: 114 MMHG

## 2020-07-26 RX ADMIN — DEXAMETHASONE SCH MG: 4 TABLET ORAL at 14:42

## 2020-07-26 RX ADMIN — APIXABAN SCH MG: 2.5 TABLET, FILM COATED ORAL at 21:00

## 2020-07-26 RX ADMIN — Medication SCH EA: at 06:22

## 2020-07-26 RX ADMIN — CEFTRIAXONE SODIUM SCH MLS/HR: 1 INJECTION, SOLUTION INTRAVENOUS at 14:42

## 2020-07-26 RX ADMIN — Medication SCH EA: at 17:29

## 2020-07-26 RX ADMIN — SODIUM CHLORIDE, SODIUM LACTATE, POTASSIUM CHLORIDE, AND CALCIUM CHLORIDE SCH MLS/HR: 600; 310; 30; 20 INJECTION, SOLUTION INTRAVENOUS at 21:00

## 2020-07-26 RX ADMIN — Medication SCH EA: at 11:48

## 2020-07-26 RX ADMIN — OXYCODONE HYDROCHLORIDE AND ACETAMINOPHEN SCH MG: 500 TABLET ORAL at 21:00

## 2020-07-26 RX ADMIN — Medication SCH MG: at 21:00

## 2020-07-26 RX ADMIN — SODIUM CHLORIDE, SODIUM LACTATE, POTASSIUM CHLORIDE, AND CALCIUM CHLORIDE SCH MLS/HR: 600; 310; 30; 20 INJECTION, SOLUTION INTRAVENOUS at 06:00

## 2020-07-26 RX ADMIN — Medication SCH MG: at 08:22

## 2020-07-26 RX ADMIN — Medication SCH UNIT: at 08:22

## 2020-07-26 RX ADMIN — ONDANSETRON PRN MG: 4 TABLET, ORALLY DISINTEGRATING ORAL at 08:26

## 2020-07-26 RX ADMIN — Medication SCH EA: at 21:00

## 2020-07-26 RX ADMIN — Medication SCH UNIT: at 21:00

## 2020-07-26 RX ADMIN — OXYCODONE HYDROCHLORIDE AND ACETAMINOPHEN SCH MG: 500 TABLET ORAL at 08:22

## 2020-07-26 RX ADMIN — APIXABAN SCH MG: 2.5 TABLET, FILM COATED ORAL at 08:26

## 2020-07-26 RX ADMIN — ONDANSETRON PRN MG: 4 TABLET, ORALLY DISINTEGRATING ORAL at 21:00

## 2020-07-27 VITALS — SYSTOLIC BLOOD PRESSURE: 122 MMHG

## 2020-07-27 VITALS — SYSTOLIC BLOOD PRESSURE: 136 MMHG

## 2020-07-27 VITALS — SYSTOLIC BLOOD PRESSURE: 119 MMHG

## 2020-07-27 VITALS — SYSTOLIC BLOOD PRESSURE: 125 MMHG

## 2020-07-27 VITALS — SYSTOLIC BLOOD PRESSURE: 118 MMHG

## 2020-07-27 RX ADMIN — DEXAMETHASONE SCH MG: 4 TABLET ORAL at 15:32

## 2020-07-27 RX ADMIN — OXYCODONE HYDROCHLORIDE AND ACETAMINOPHEN SCH MG: 500 TABLET ORAL at 21:00

## 2020-07-27 RX ADMIN — Medication SCH MG: at 09:42

## 2020-07-27 RX ADMIN — Medication SCH UNIT: at 09:42

## 2020-07-27 RX ADMIN — CEFTRIAXONE SODIUM SCH MLS/HR: 1 INJECTION, SOLUTION INTRAVENOUS at 15:33

## 2020-07-27 RX ADMIN — ONDANSETRON PRN MG: 4 TABLET, ORALLY DISINTEGRATING ORAL at 21:00

## 2020-07-27 RX ADMIN — SODIUM CHLORIDE, SODIUM LACTATE, POTASSIUM CHLORIDE, AND CALCIUM CHLORIDE SCH MLS/HR: 600; 310; 30; 20 INJECTION, SOLUTION INTRAVENOUS at 02:00

## 2020-07-27 RX ADMIN — SODIUM CHLORIDE, SODIUM LACTATE, POTASSIUM CHLORIDE, AND CALCIUM CHLORIDE SCH MLS/HR: 600; 310; 30; 20 INJECTION, SOLUTION INTRAVENOUS at 12:00

## 2020-07-27 RX ADMIN — Medication SCH EA: at 06:34

## 2020-07-27 RX ADMIN — APIXABAN SCH MG: 2.5 TABLET, FILM COATED ORAL at 09:42

## 2020-07-27 RX ADMIN — OXYCODONE HYDROCHLORIDE AND ACETAMINOPHEN SCH MG: 500 TABLET ORAL at 09:42

## 2020-07-27 RX ADMIN — Medication SCH EA: at 12:07

## 2020-07-27 RX ADMIN — Medication SCH MG: at 21:00

## 2020-07-27 RX ADMIN — Medication SCH EA: at 21:00

## 2020-07-27 RX ADMIN — APIXABAN SCH MG: 2.5 TABLET, FILM COATED ORAL at 21:00

## 2020-07-27 RX ADMIN — Medication SCH EA: at 16:48

## 2020-07-27 RX ADMIN — SODIUM CHLORIDE, SODIUM LACTATE, POTASSIUM CHLORIDE, AND CALCIUM CHLORIDE SCH MLS/HR: 600; 310; 30; 20 INJECTION, SOLUTION INTRAVENOUS at 17:53

## 2020-07-27 RX ADMIN — Medication SCH UNIT: at 21:00

## 2020-07-28 VITALS — SYSTOLIC BLOOD PRESSURE: 132 MMHG

## 2020-07-28 VITALS — SYSTOLIC BLOOD PRESSURE: 118 MMHG

## 2020-07-28 VITALS — SYSTOLIC BLOOD PRESSURE: 124 MMHG

## 2020-07-28 VITALS — SYSTOLIC BLOOD PRESSURE: 122 MMHG

## 2020-07-28 VITALS — SYSTOLIC BLOOD PRESSURE: 125 MMHG

## 2020-07-28 RX ADMIN — Medication SCH MG: at 09:44

## 2020-07-28 RX ADMIN — Medication SCH EA: at 12:06

## 2020-07-28 RX ADMIN — CEFTRIAXONE SODIUM SCH MLS/HR: 1 INJECTION, SOLUTION INTRAVENOUS at 14:54

## 2020-07-28 RX ADMIN — Medication SCH UNIT: at 21:26

## 2020-07-28 RX ADMIN — Medication SCH MG: at 21:26

## 2020-07-28 RX ADMIN — Medication SCH EA: at 17:00

## 2020-07-28 RX ADMIN — Medication SCH UNIT: at 09:44

## 2020-07-28 RX ADMIN — Medication SCH EA: at 21:00

## 2020-07-28 RX ADMIN — APIXABAN SCH MG: 2.5 TABLET, FILM COATED ORAL at 09:44

## 2020-07-28 RX ADMIN — OXYCODONE HYDROCHLORIDE AND ACETAMINOPHEN SCH MG: 500 TABLET ORAL at 21:26

## 2020-07-28 RX ADMIN — DEXAMETHASONE SCH MG: 4 TABLET ORAL at 14:55

## 2020-07-28 RX ADMIN — Medication SCH EA: at 06:50

## 2020-07-28 RX ADMIN — SODIUM CHLORIDE, SODIUM LACTATE, POTASSIUM CHLORIDE, AND CALCIUM CHLORIDE SCH MLS/HR: 600; 310; 30; 20 INJECTION, SOLUTION INTRAVENOUS at 16:25

## 2020-07-28 RX ADMIN — ONDANSETRON PRN MG: 4 TABLET, ORALLY DISINTEGRATING ORAL at 21:00

## 2020-07-28 RX ADMIN — APIXABAN SCH MG: 2.5 TABLET, FILM COATED ORAL at 21:26

## 2020-07-28 RX ADMIN — OXYCODONE HYDROCHLORIDE AND ACETAMINOPHEN SCH MG: 500 TABLET ORAL at 09:44

## 2020-07-28 RX ADMIN — SODIUM CHLORIDE, SODIUM LACTATE, POTASSIUM CHLORIDE, AND CALCIUM CHLORIDE SCH MLS/HR: 600; 310; 30; 20 INJECTION, SOLUTION INTRAVENOUS at 06:51

## 2020-07-29 VITALS — SYSTOLIC BLOOD PRESSURE: 118 MMHG

## 2020-07-29 RX ADMIN — APIXABAN SCH MG: 2.5 TABLET, FILM COATED ORAL at 08:31

## 2020-07-29 RX ADMIN — OXYCODONE HYDROCHLORIDE AND ACETAMINOPHEN SCH MG: 500 TABLET ORAL at 08:32

## 2020-07-29 RX ADMIN — CEFTRIAXONE SODIUM SCH MLS/HR: 1 INJECTION, SOLUTION INTRAVENOUS at 14:13

## 2020-07-29 RX ADMIN — Medication SCH UNIT: at 21:00

## 2020-07-29 RX ADMIN — Medication SCH MG: at 21:00

## 2020-07-29 RX ADMIN — APIXABAN SCH MG: 2.5 TABLET, FILM COATED ORAL at 21:00

## 2020-07-29 RX ADMIN — OXYCODONE HYDROCHLORIDE AND ACETAMINOPHEN SCH MG: 500 TABLET ORAL at 08:23

## 2020-07-29 RX ADMIN — DEXAMETHASONE SCH MG: 4 TABLET ORAL at 14:13

## 2020-07-29 RX ADMIN — Medication SCH MG: at 08:32

## 2020-07-29 RX ADMIN — SODIUM CHLORIDE, SODIUM LACTATE, POTASSIUM CHLORIDE, AND CALCIUM CHLORIDE SCH MLS/HR: 600; 310; 30; 20 INJECTION, SOLUTION INTRAVENOUS at 06:00

## 2020-07-29 RX ADMIN — Medication SCH EA: at 21:00

## 2020-07-29 RX ADMIN — Medication SCH UNIT: at 08:23

## 2020-07-29 RX ADMIN — Medication SCH EA: at 06:00

## 2020-07-29 RX ADMIN — Medication SCH EA: at 17:00

## 2020-07-29 RX ADMIN — OXYCODONE HYDROCHLORIDE AND ACETAMINOPHEN SCH MG: 500 TABLET ORAL at 21:00

## 2020-07-29 RX ADMIN — Medication SCH EA: at 11:13

## 2020-07-29 RX ADMIN — SODIUM CHLORIDE, SODIUM LACTATE, POTASSIUM CHLORIDE, AND CALCIUM CHLORIDE SCH MLS/HR: 600; 310; 30; 20 INJECTION, SOLUTION INTRAVENOUS at 13:08

## 2020-07-29 RX ADMIN — Medication SCH UNIT: at 08:32

## 2020-07-29 RX ADMIN — Medication SCH MG: at 08:23

## 2020-07-30 RX ADMIN — Medication SCH MG: at 21:00

## 2020-07-30 RX ADMIN — APIXABAN SCH MG: 2.5 TABLET, FILM COATED ORAL at 08:28

## 2020-07-30 RX ADMIN — Medication SCH EA: at 17:00

## 2020-07-30 RX ADMIN — SODIUM CHLORIDE, SODIUM LACTATE, POTASSIUM CHLORIDE, AND CALCIUM CHLORIDE SCH MLS/HR: 600; 310; 30; 20 INJECTION, SOLUTION INTRAVENOUS at 04:15

## 2020-07-30 RX ADMIN — OXYCODONE HYDROCHLORIDE AND ACETAMINOPHEN SCH MG: 500 TABLET ORAL at 21:00

## 2020-07-30 RX ADMIN — Medication SCH EA: at 21:00

## 2020-07-30 RX ADMIN — DEXAMETHASONE SCH MG: 4 TABLET ORAL at 13:54

## 2020-07-30 RX ADMIN — Medication SCH UNIT: at 21:00

## 2020-07-30 RX ADMIN — Medication SCH MG: at 08:28

## 2020-07-30 RX ADMIN — Medication SCH UNIT: at 08:29

## 2020-07-30 RX ADMIN — SODIUM CHLORIDE, SODIUM LACTATE, POTASSIUM CHLORIDE, AND CALCIUM CHLORIDE SCH MLS/HR: 600; 310; 30; 20 INJECTION, SOLUTION INTRAVENOUS at 06:00

## 2020-07-30 RX ADMIN — SODIUM CHLORIDE, SODIUM LACTATE, POTASSIUM CHLORIDE, AND CALCIUM CHLORIDE SCH MLS/HR: 600; 310; 30; 20 INJECTION, SOLUTION INTRAVENOUS at 09:40

## 2020-07-30 RX ADMIN — CEFTRIAXONE SODIUM SCH MLS/HR: 1 INJECTION, SOLUTION INTRAVENOUS at 14:25

## 2020-07-30 RX ADMIN — OXYCODONE HYDROCHLORIDE AND ACETAMINOPHEN SCH MG: 500 TABLET ORAL at 08:29

## 2020-07-30 RX ADMIN — Medication SCH EA: at 11:07

## 2020-07-30 RX ADMIN — Medication SCH EA: at 06:25

## 2020-07-30 RX ADMIN — APIXABAN SCH MG: 2.5 TABLET, FILM COATED ORAL at 21:00

## 2020-07-31 RX ADMIN — Medication SCH EA: at 11:30

## 2020-07-31 RX ADMIN — DEXAMETHASONE SCH MG: 4 TABLET ORAL at 14:15

## 2020-07-31 RX ADMIN — OXYCODONE HYDROCHLORIDE AND ACETAMINOPHEN SCH MG: 500 TABLET ORAL at 21:00

## 2020-07-31 RX ADMIN — CEFTRIAXONE SODIUM SCH MLS/HR: 1 INJECTION, SOLUTION INTRAVENOUS at 15:27

## 2020-07-31 RX ADMIN — SODIUM CHLORIDE, SODIUM LACTATE, POTASSIUM CHLORIDE, AND CALCIUM CHLORIDE SCH MLS/HR: 600; 310; 30; 20 INJECTION, SOLUTION INTRAVENOUS at 07:57

## 2020-07-31 RX ADMIN — OXYCODONE HYDROCHLORIDE AND ACETAMINOPHEN SCH MG: 500 TABLET ORAL at 09:00

## 2020-07-31 RX ADMIN — Medication SCH EA: at 21:00

## 2020-07-31 RX ADMIN — Medication SCH EA: at 06:40

## 2020-07-31 RX ADMIN — Medication SCH UNIT: at 21:00

## 2020-07-31 RX ADMIN — APIXABAN SCH MG: 2.5 TABLET, FILM COATED ORAL at 09:00

## 2020-07-31 RX ADMIN — APIXABAN SCH MG: 2.5 TABLET, FILM COATED ORAL at 21:00

## 2020-07-31 RX ADMIN — Medication SCH EA: at 17:00

## 2020-07-31 RX ADMIN — Medication SCH UNIT: at 09:00

## 2020-07-31 RX ADMIN — SODIUM CHLORIDE, SODIUM LACTATE, POTASSIUM CHLORIDE, AND CALCIUM CHLORIDE SCH MLS/HR: 600; 310; 30; 20 INJECTION, SOLUTION INTRAVENOUS at 15:27

## 2020-07-31 RX ADMIN — HUMAN INSULIN PRN UNITS: 100 INJECTION, SOLUTION SUBCUTANEOUS at 21:45

## 2020-07-31 RX ADMIN — Medication SCH MG: at 09:00

## 2020-07-31 RX ADMIN — Medication SCH MG: at 21:00

## 2020-08-01 RX ADMIN — Medication SCH UNIT: at 09:00

## 2020-08-01 RX ADMIN — Medication SCH UNIT: at 21:00

## 2020-08-01 RX ADMIN — HUMAN INSULIN PRN UNITS: 100 INJECTION, SOLUTION SUBCUTANEOUS at 07:02

## 2020-08-01 RX ADMIN — Medication SCH MG: at 09:00

## 2020-08-01 RX ADMIN — Medication SCH EA: at 17:00

## 2020-08-01 RX ADMIN — Medication SCH EA: at 11:30

## 2020-08-01 RX ADMIN — Medication SCH MG: at 21:00

## 2020-08-01 RX ADMIN — SODIUM CHLORIDE, SODIUM LACTATE, POTASSIUM CHLORIDE, AND CALCIUM CHLORIDE SCH MLS/HR: 600; 310; 30; 20 INJECTION, SOLUTION INTRAVENOUS at 01:33

## 2020-08-01 RX ADMIN — CEFTRIAXONE SODIUM SCH MLS/HR: 1 INJECTION, SOLUTION INTRAVENOUS at 14:39

## 2020-08-01 RX ADMIN — Medication SCH ML: at 21:46

## 2020-08-01 RX ADMIN — Medication SCH ML: at 13:47

## 2020-08-01 RX ADMIN — Medication SCH EA: at 07:00

## 2020-08-01 RX ADMIN — APIXABAN SCH MG: 2.5 TABLET, FILM COATED ORAL at 21:00

## 2020-08-01 RX ADMIN — DEXAMETHASONE SCH MG: 4 TABLET ORAL at 13:47

## 2020-08-01 RX ADMIN — OXYCODONE HYDROCHLORIDE AND ACETAMINOPHEN SCH MG: 500 TABLET ORAL at 09:00

## 2020-08-01 RX ADMIN — OXYCODONE HYDROCHLORIDE AND ACETAMINOPHEN SCH MG: 500 TABLET ORAL at 21:00

## 2020-08-01 RX ADMIN — Medication SCH EA: at 21:00

## 2020-08-01 RX ADMIN — APIXABAN SCH MG: 2.5 TABLET, FILM COATED ORAL at 09:00

## 2020-08-02 VITALS — SYSTOLIC BLOOD PRESSURE: 120 MMHG

## 2020-08-02 VITALS — SYSTOLIC BLOOD PRESSURE: 127 MMHG

## 2020-08-02 LAB
ANION GAP SERPL CALCULATED.3IONS-SCNC: < 3 MMOL/L (ref 5–15)
BASOPHILS # BLD AUTO: 0 K/UL (ref 0–0.2)
BASOPHILS NFR BLD AUTO: 0.6 % (ref 0–2)
BUN SERPL-MCNC: 11 MG/DL (ref 8–21)
CALCIUM SERPL-MCNC: 8.5 MG/DL (ref 8.4–11)
CHLORIDE SERPL-SCNC: 103 MMOL/L (ref 98–107)
CREAT SERPL-MCNC: 1.14 MG/DL (ref 0.55–1.3)
CRP SERPL-MCNC: 1 MG/DL (ref 0–0.5)
EOSINOPHIL # BLD AUTO: 0.1 K/UL (ref 0–0.4)
EOSINOPHIL NFR BLD AUTO: 1.7 % (ref 0–4)
ERYTHROCYTE [DISTWIDTH] IN BLOOD BY AUTOMATED COUNT: 13.8 % (ref 9–15)
ERYTHROCYTE [SEDIMENTATION RATE] IN BLOOD BY WESTERGREN METHOD: 20 MM/HR (ref 0–20)
GFR SERPL CREATININE-BSD FRML MDRD: 61 ML/MIN (ref 90–?)
GLUCOSE SERPL-MCNC: 112 MG/DL (ref 70–99)
HCT VFR BLD AUTO: 41.5 % (ref 36–48)
HGB BLD-MCNC: 14 G/DL (ref 12–16)
LYMPHOCYTES # BLD AUTO: 1 K/UL (ref 1–5.5)
LYMPHOCYTES NFR BLD AUTO: 12.9 % (ref 20.5–51.5)
MCH RBC QN AUTO: 32 PG (ref 27–31)
MCHC RBC AUTO-ENTMCNC: 34 % (ref 32–36)
MCV RBC AUTO: 95 FL (ref 79–98)
MONOCYTES # BLD MANUAL: 0.6 K/UL (ref 0–1)
MONOCYTES # BLD MANUAL: 8 % (ref 1.7–9.3)
NEUTROPHILS # BLD AUTO: 5.8 K/UL (ref 1.8–7.7)
NEUTROPHILS NFR BLD AUTO: 76.8 % (ref 40–70)
PLATELET # BLD AUTO: 220 K/UL (ref 130–430)
POTASSIUM SERPL-SCNC: 3.3 MMOL/L (ref 3.5–5.1)
RBC # BLD AUTO: 4.37 MIL/UL (ref 4.2–6.2)
SODIUM SERPLBLD-SCNC: 134 MMOL/L (ref 136–145)
WBC # BLD AUTO: 7.5 K/UL (ref 4.8–10.8)

## 2020-08-02 RX ADMIN — Medication SCH UNIT: at 08:36

## 2020-08-02 RX ADMIN — Medication SCH ML: at 06:00

## 2020-08-02 RX ADMIN — CEFTRIAXONE SODIUM SCH MLS/HR: 1 INJECTION, SOLUTION INTRAVENOUS at 14:54

## 2020-08-02 RX ADMIN — APIXABAN SCH MG: 2.5 TABLET, FILM COATED ORAL at 21:00

## 2020-08-02 RX ADMIN — APIXABAN SCH MG: 2.5 TABLET, FILM COATED ORAL at 08:36

## 2020-08-02 RX ADMIN — OXYCODONE HYDROCHLORIDE AND ACETAMINOPHEN SCH MG: 500 TABLET ORAL at 21:00

## 2020-08-02 RX ADMIN — Medication SCH MG: at 21:00

## 2020-08-02 RX ADMIN — CEFTRIAXONE SODIUM SCH MLS/HR: 1 INJECTION, SOLUTION INTRAVENOUS at 16:21

## 2020-08-02 RX ADMIN — DEXAMETHASONE SCH MG: 4 TABLET ORAL at 13:42

## 2020-08-02 RX ADMIN — Medication SCH EA: at 11:30

## 2020-08-02 RX ADMIN — Medication SCH MG: at 08:36

## 2020-08-02 RX ADMIN — Medication SCH EA: at 06:41

## 2020-08-02 RX ADMIN — Medication SCH UNIT: at 21:00

## 2020-08-02 RX ADMIN — Medication SCH ML: at 22:00

## 2020-08-02 RX ADMIN — OXYCODONE HYDROCHLORIDE AND ACETAMINOPHEN SCH MG: 500 TABLET ORAL at 08:36

## 2020-08-02 RX ADMIN — Medication SCH ML: at 13:42

## 2020-08-02 RX ADMIN — Medication SCH EA: at 21:53

## 2020-08-03 VITALS — SYSTOLIC BLOOD PRESSURE: 120 MMHG

## 2020-08-03 VITALS — SYSTOLIC BLOOD PRESSURE: 127 MMHG

## 2020-08-03 LAB
ALBUMIN SERPL BCP-MCNC: 2.6 G/DL (ref 3.4–4.8)
ALT SERPL W P-5'-P-CCNC: 100 U/L (ref 12–78)
ANION GAP SERPL CALCULATED.3IONS-SCNC: 6 MMOL/L (ref 5–15)
AST SERPL W P-5'-P-CCNC: 31 U/L (ref 10–37)
BASOPHILS # BLD AUTO: 0.1 K/UL (ref 0–0.2)
BASOPHILS NFR BLD AUTO: 0.8 % (ref 0–2)
BILIRUB SERPL-MCNC: 0.8 MG/DL (ref 0–1)
BUN SERPL-MCNC: 17 MG/DL (ref 8–21)
CALCIUM SERPL-MCNC: 8.2 MG/DL (ref 8.4–11)
CHLORIDE SERPL-SCNC: 106 MMOL/L (ref 98–107)
CREAT SERPL-MCNC: 0.83 MG/DL (ref 0.55–1.3)
CRP SERPL-MCNC: 0.9 MG/DL (ref 0–0.5)
EOSINOPHIL # BLD AUTO: 0.2 K/UL (ref 0–0.4)
EOSINOPHIL NFR BLD AUTO: 2.8 % (ref 0–4)
ERYTHROCYTE [DISTWIDTH] IN BLOOD BY AUTOMATED COUNT: 13.7 % (ref 9–15)
ERYTHROCYTE [SEDIMENTATION RATE] IN BLOOD BY WESTERGREN METHOD: 20 MM/HR (ref 0–20)
GFR SERPL CREATININE-BSD FRML MDRD: 88 ML/MIN (ref 90–?)
GLUCOSE SERPL-MCNC: 96 MG/DL (ref 70–99)
HCT VFR BLD AUTO: 37.9 % (ref 36–48)
HGB BLD-MCNC: 12.9 G/DL (ref 12–16)
LYMPHOCYTES # BLD AUTO: 1.3 K/UL (ref 1–5.5)
LYMPHOCYTES NFR BLD AUTO: 19.9 % (ref 20.5–51.5)
MCH RBC QN AUTO: 32 PG (ref 27–31)
MCHC RBC AUTO-ENTMCNC: 34 % (ref 32–36)
MCV RBC AUTO: 94 FL (ref 79–98)
MONOCYTES # BLD MANUAL: 0.7 K/UL (ref 0–1)
MONOCYTES # BLD MANUAL: 10 % (ref 1.7–9.3)
NEUTROPHILS # BLD AUTO: 4.4 K/UL (ref 1.8–7.7)
NEUTROPHILS NFR BLD AUTO: 66.5 % (ref 40–70)
PLATELET # BLD AUTO: 162 K/UL (ref 130–430)
POTASSIUM SERPL-SCNC: 3.1 MMOL/L (ref 3.5–5.1)
RBC # BLD AUTO: 4.05 MIL/UL (ref 4.2–6.2)
SODIUM SERPLBLD-SCNC: 139 MMOL/L (ref 136–145)
WBC # BLD AUTO: 6.6 K/UL (ref 4.8–10.8)

## 2020-08-03 RX ADMIN — Medication SCH UNIT: at 08:39

## 2020-08-03 RX ADMIN — Medication SCH MG: at 08:39

## 2020-08-03 RX ADMIN — Medication SCH EA: at 06:40

## 2020-08-03 RX ADMIN — OXYCODONE HYDROCHLORIDE AND ACETAMINOPHEN SCH MG: 500 TABLET ORAL at 08:39

## 2020-08-03 RX ADMIN — APIXABAN SCH MG: 2.5 TABLET, FILM COATED ORAL at 08:40

## 2020-08-03 RX ADMIN — Medication SCH ML: at 06:00

## 2022-09-15 NOTE — PROGRESS NOTES
DATE:  06/30/2020



Case was discussed with staff of the patient, reviewed records.  The patient has

been refusing medications.  She is paranoid.  When I talked to her today, I told

her that we talked yesterday.  She cannot remember me.  She told me that I am

fraudulent, I am just billing her insurance without seeing her.  Continues to

have poor insight, paranoid, unable to make safe plan for self-care, easily

agitated.  She apparently refused to allow her  to have any access to the

unit or for the staff to contact him and he apparently found out from the other

hospital where she is and he called and was upset.  The patient continues to

refuse her medications.  If she continues to refuse, I will be initiating a

Riese on her.  We will continue outpatient group therapy, milieu therapy, adjust

the medication as needed.





DD: 06/30/2020 11:47

DT: 06/30/2020 21:02

JOB# [id number]  [id number] No Repair - Repaired With Adjacent Surgical Defect Text (Leave Blank If You Do Not Want): After the excision the defect was repaired concurrently with another surgical defect which was in close approximation.